# Patient Record
Sex: MALE | Race: WHITE | NOT HISPANIC OR LATINO | Employment: OTHER | URBAN - METROPOLITAN AREA
[De-identification: names, ages, dates, MRNs, and addresses within clinical notes are randomized per-mention and may not be internally consistent; named-entity substitution may affect disease eponyms.]

---

## 2020-02-06 ENCOUNTER — OFFICE VISIT (OUTPATIENT)
Dept: FAMILY MEDICINE CLINIC | Facility: CLINIC | Age: 65
End: 2020-02-06

## 2020-02-06 VITALS
HEIGHT: 70 IN | TEMPERATURE: 97.3 F | OXYGEN SATURATION: 95 % | RESPIRATION RATE: 16 BRPM | HEART RATE: 96 BPM | BODY MASS INDEX: 28.23 KG/M2 | WEIGHT: 197.2 LBS

## 2020-02-06 DIAGNOSIS — E03.9 ACQUIRED HYPOTHYROIDISM: ICD-10-CM

## 2020-02-06 DIAGNOSIS — Z00.00 HEALTH CARE MAINTENANCE: Primary | ICD-10-CM

## 2020-02-06 DIAGNOSIS — I10 ESSENTIAL HYPERTENSION: ICD-10-CM

## 2020-02-06 PROBLEM — Z28.20 VACCINE REFUSED BY PATIENT: Status: ACTIVE | Noted: 2020-02-06

## 2020-02-06 LAB — HBA1C MFR BLD: 5.3 %

## 2020-02-06 PROCEDURE — 85025 COMPLETE CBC W/AUTO DIFF WBC: CPT | Performed by: NURSE PRACTITIONER

## 2020-02-06 PROCEDURE — 80053 COMPREHEN METABOLIC PANEL: CPT | Performed by: NURSE PRACTITIONER

## 2020-02-06 PROCEDURE — 99396 PREV VISIT EST AGE 40-64: CPT | Performed by: NURSE PRACTITIONER

## 2020-02-06 PROCEDURE — 84443 ASSAY THYROID STIM HORMONE: CPT | Performed by: NURSE PRACTITIONER

## 2020-02-06 PROCEDURE — 83036 HEMOGLOBIN GLYCOSYLATED A1C: CPT | Performed by: NURSE PRACTITIONER

## 2020-02-06 PROCEDURE — 80061 LIPID PANEL: CPT | Performed by: NURSE PRACTITIONER

## 2020-02-06 PROCEDURE — 84439 ASSAY OF FREE THYROXINE: CPT | Performed by: NURSE PRACTITIONER

## 2020-02-06 RX ORDER — ANASTROZOLE 1 MG/1
0.5 TABLET ORAL 2 TIMES WEEKLY
COMMUNITY

## 2020-02-06 RX ORDER — LOSARTAN POTASSIUM 50 MG/1
50 TABLET ORAL DAILY
Qty: 90 TABLET | Refills: 3 | Status: SHIPPED | OUTPATIENT
Start: 2020-02-06 | End: 2020-02-07 | Stop reason: SDUPTHER

## 2020-02-06 RX ORDER — LEVOTHYROXINE AND LIOTHYRONINE 38; 9 UG/1; UG/1
60 TABLET ORAL DAILY
Qty: 90 TABLET | Refills: 3 | Status: SHIPPED | OUTPATIENT
Start: 2020-02-06 | End: 2021-02-09

## 2020-02-06 NOTE — PROGRESS NOTES
"Patient is here to establish care and for annual wellness exam.    Dr Lobo. [facility]. Managing Low T and PSA.    Concerns today none    Last health maintenance visit was 5 years . Overall they feel their health is good . Lives with alone  Occupation is IT. Patient's diet is in general, an \"unhealthy\" diet. Exercises regularly regularly 7 times weekly cardio and weights. Tobacco use Never used. Alcohol use is > 5 beers per week . Illicit drug no history of illicit drug use    Reproductive Health  Patient is not sexually active and prefers declined to answer partners.  no method at present time     Screening Tests  Vision Impairment. No Vision Impairment and Eye Exam Up To Date   Hearing normal  Dental: Brushes does teeth twice a day. Dental exam every six months?yes  Colonoscopy yes  Prostate Exam yes      Immunization History  Tdap? no  HPV? no  Pneumonia? no  Shingles? no    The following portions of the patient's history were reviewed and updated as appropriate: allergies, current medications, past family history, past medical history, past social history, past surgical history and problem list.    Past Medical History:   Diagnosis Date   • Elevated PSA    • Hypertension    • Hypothyroidism    • Low testosterone in male    • Vaccine refused by patient 2/6/2020       Family History   Problem Relation Age of Onset   • Cancer Mother         breast   • Cancer Father 90        colon, bladder   • Cancer Sister 53        breast, colon    • No Known Problems Brother    • Stroke Maternal Grandmother    • Stroke Maternal Grandfather    • No Known Problems Paternal Grandmother         101   • No Known Problems Paternal Grandfather         90       Past Surgical History:   Procedure Laterality Date   • CYST REMOVAL      epinodal cyst removal, back of neck       Social History     Socioeconomic History   • Marital status: Single     Spouse name: Not on file   • Number of children: Not on file   • Years of education: Not on " file   • Highest education level: Not on file   Occupational History   • Occupation: IT     Comment: formerly [employer]   Tobacco Use   • Smoking status: Never Smoker   • Smokeless tobacco: Never Used   Substance and Sexual Activity   • Alcohol use: Yes     Alcohol/week: 1.0 standard drinks     Types: 1 Shots of liquor per week     Comment: 12 total weekly    • Drug use: Never   • Sexual activity: Never   Social History Narrative    Lives with dog. Single       Review of Systems  Do you have pain that bothers you in your daily life? no  Review of Systems   Constitutional: Negative for fatigue, fever and unexpected weight change.   HENT: Negative for congestion, hearing loss, nosebleeds, rhinorrhea, sore throat, trouble swallowing and voice change.    Eyes: Negative for pain, discharge, redness and visual disturbance.   Respiratory: Negative for cough, chest tightness, shortness of breath and wheezing.    Cardiovascular: Negative for chest pain, palpitations and leg swelling.        [facility] for chest pain. Work up neg 2015. Not stress test   Gastrointestinal: Positive for abdominal pain. Negative for abdominal distention, anal bleeding, blood in stool, constipation, diarrhea, nausea and vomiting.        Heartburn     Endocrine: Negative for cold intolerance, heat intolerance, polydipsia, polyphagia and polyuria.   Genitourinary: Negative for dysuria, flank pain, frequency and hematuria.   Musculoskeletal: Positive for arthralgias. Negative for gait problem, joint swelling and myalgias.        Right elbow tendonitis   Skin: Negative for color change and rash.   Neurological: Negative for dizziness, tremors, seizures, syncope, speech difficulty, weakness, numbness and headaches.   Hematological: Negative.    Psychiatric/Behavioral: Negative.        Objective   Physical Exam   Constitutional: He is oriented to person, place, and time. He appears well-developed and well-nourished. No distress.   HENT:   Head:  Normocephalic and atraumatic.   Right Ear: Tympanic membrane and external ear normal.   Left Ear: Tympanic membrane and external ear normal.   Nose: Nose normal.   Mouth/Throat: Oropharynx is clear and moist. No oropharyngeal exudate.   Eyes: Pupils are equal, round, and reactive to light. Conjunctivae are normal. Right eye exhibits no discharge. Left eye exhibits no discharge. No scleral icterus.   Neck: Neck supple. No tracheal deviation present. No thyromegaly present.   Cardiovascular: Normal rate, regular rhythm and normal heart sounds. Exam reveals no gallop and no friction rub.   No murmur heard.  Pulmonary/Chest: Effort normal and breath sounds normal. No respiratory distress. He has no wheezes.   Abdominal: Soft. Bowel sounds are normal. He exhibits no distension and no mass. There is no tenderness.   Musculoskeletal: He exhibits no edema or deformity.   Lymphadenopathy:     He has no cervical adenopathy.   Neurological: He is alert and oriented to person, place, and time. Coordination normal.   Skin: Skin is warm and dry. Capillary refill takes less than 2 seconds. No rash noted. No erythema.   Psychiatric: He has a normal mood and affect. His speech is normal and behavior is normal. Judgment and thought content normal.   Nursing note and vitals reviewed.     Parker was seen today for establish care.    Diagnoses and all orders for this visit:    Health care maintenance  -     POC Glycosylated Hemoglobin (Hb A1C)  -     Cancel: POC Urinalysis Dipstick, Automated  -     CBC & Differential  -     Comprehensive Metabolic Panel  -     Lipid Panel  -     T4, Free  -     TSH  -     CBC Auto Differential    Acquired hypothyroidism  -     Thyroid 60 MG PO tablet; Take 1 tablet by mouth Daily.    Essential hypertension  -     losartan (COZAAR) 50 MG tablet; Take 1 tablet by mouth Daily.    Elevated PSA    Low testosterone in male        1. Discuss extended office hours and appropriate use of the ER. Discussed no  controlled substances prescribed from this office. Appropriate referrals will be made to pain management and psychiatry if needed. Stressed the importance and expectation of medical compliance with plan of care, medications, and follow up appointments.  2. Patient Counseling:  --Nutrition: Stressed importance of moderation in sodium/caffeine intake, saturated fat and cholesterol, caloric balance, sufficient intake of fresh fruits, vegetables, fiber, calcium  --Exercise: Stressed the importance of regular exercise.   --Substance Abuse: Discussed cessation/primary prevention of tobacco, alcohol, or other drug use; driving or other dangerous activities under the influence; availability of treatment for abuse.   --Injury prevention: Discussed safety belts, safety helmets, smoke detector, smoking near bedding or upholstery.   --Dental health: Discussed importance of regular tooth brushing, flossing, and dental visits.  --Immunizations reviewed. He declines.  --Discussed benefits of screening colonoscopy.  --After hours service discussed with patient    3. Discussed the patient's BMI with him.  The BMI is above average; BMI management plan is completed  4. Follow up next physical in 1 year  5. Discussed the nature of the disease including, risks, complications, implications, management, safe and proper use of medications. Encouraged therapeutic lifestyle changes including low calorie diet with plenty of fruits and vegetables, daily exercise, medication compliance, and keeping scheduled follow up appointments with me and any other providers. Encouraged patient to have appointment for complete physical, fasting labs, appropriate screenings, and immunizations on an annual basis.  6. Healthy gentleman. Anticipate visit every 6 months for thn and hypothyroidism. Will let Dr Lobo continue to follow psa.

## 2020-02-07 ENCOUNTER — TELEPHONE (OUTPATIENT)
Dept: FAMILY MEDICINE CLINIC | Facility: CLINIC | Age: 65
End: 2020-02-07

## 2020-02-07 DIAGNOSIS — I10 ESSENTIAL HYPERTENSION: ICD-10-CM

## 2020-02-07 PROBLEM — R74.8 ELEVATED LIVER ENZYMES: Status: ACTIVE | Noted: 2020-02-07

## 2020-02-07 PROBLEM — E78.2 MODERATE MIXED HYPERLIPIDEMIA NOT REQUIRING STATIN THERAPY: Status: ACTIVE | Noted: 2020-02-07

## 2020-02-07 PROBLEM — N28.9 RENAL INSUFFICIENCY: Status: ACTIVE | Noted: 2020-02-07

## 2020-02-07 LAB
ALBUMIN SERPL-MCNC: 4.5 G/DL (ref 3.5–5.2)
ALBUMIN/GLOB SERPL: 1.8 G/DL
ALP SERPL-CCNC: 80 U/L (ref 39–117)
ALT SERPL W P-5'-P-CCNC: 80 U/L (ref 1–41)
ANION GAP SERPL CALCULATED.3IONS-SCNC: 13.3 MMOL/L (ref 5–15)
AST SERPL-CCNC: 49 U/L (ref 1–40)
BASOPHILS # BLD AUTO: 0.05 10*3/MM3 (ref 0–0.2)
BASOPHILS NFR BLD AUTO: 1.1 % (ref 0–1.5)
BILIRUB SERPL-MCNC: 0.6 MG/DL (ref 0.2–1.2)
BUN BLD-MCNC: 20 MG/DL (ref 8–23)
BUN/CREAT SERPL: 14.2 (ref 7–25)
CALCIUM SPEC-SCNC: 9.9 MG/DL (ref 8.6–10.5)
CHLORIDE SERPL-SCNC: 99 MMOL/L (ref 98–107)
CHOLEST SERPL-MCNC: 237 MG/DL (ref 0–200)
CO2 SERPL-SCNC: 25.7 MMOL/L (ref 22–29)
CREAT BLD-MCNC: 1.41 MG/DL (ref 0.76–1.27)
DEPRECATED RDW RBC AUTO: 44.2 FL (ref 37–54)
EOSINOPHIL # BLD AUTO: 0.16 10*3/MM3 (ref 0–0.4)
EOSINOPHIL NFR BLD AUTO: 3.5 % (ref 0.3–6.2)
ERYTHROCYTE [DISTWIDTH] IN BLOOD BY AUTOMATED COUNT: 12.7 % (ref 12.3–15.4)
GFR SERPL CREATININE-BSD FRML MDRD: 51 ML/MIN/1.73
GLOBULIN UR ELPH-MCNC: 2.5 GM/DL
GLUCOSE BLD-MCNC: 123 MG/DL (ref 65–99)
HCT VFR BLD AUTO: 46.6 % (ref 37.5–51)
HDLC SERPL-MCNC: 62 MG/DL (ref 40–60)
HGB BLD-MCNC: 16.2 G/DL (ref 13–17.7)
IMM GRANULOCYTES # BLD AUTO: 0.02 10*3/MM3 (ref 0–0.05)
IMM GRANULOCYTES NFR BLD AUTO: 0.4 % (ref 0–0.5)
LDLC SERPL CALC-MCNC: 140 MG/DL (ref 0–100)
LDLC/HDLC SERPL: 2.26 {RATIO}
LYMPHOCYTES # BLD AUTO: 1.17 10*3/MM3 (ref 0.7–3.1)
LYMPHOCYTES NFR BLD AUTO: 25.9 % (ref 19.6–45.3)
MCH RBC QN AUTO: 33.1 PG (ref 26.6–33)
MCHC RBC AUTO-ENTMCNC: 34.8 G/DL (ref 31.5–35.7)
MCV RBC AUTO: 95.3 FL (ref 79–97)
MONOCYTES # BLD AUTO: 0.65 10*3/MM3 (ref 0.1–0.9)
MONOCYTES NFR BLD AUTO: 14.4 % (ref 5–12)
NEUTROPHILS # BLD AUTO: 2.47 10*3/MM3 (ref 1.7–7)
NEUTROPHILS NFR BLD AUTO: 54.7 % (ref 42.7–76)
NRBC BLD AUTO-RTO: 0 /100 WBC (ref 0–0.2)
PLATELET # BLD AUTO: 199 10*3/MM3 (ref 140–450)
PMV BLD AUTO: 10.4 FL (ref 6–12)
POTASSIUM BLD-SCNC: 4.4 MMOL/L (ref 3.5–5.2)
PROT SERPL-MCNC: 7 G/DL (ref 6–8.5)
RBC # BLD AUTO: 4.89 10*6/MM3 (ref 4.14–5.8)
SODIUM BLD-SCNC: 138 MMOL/L (ref 136–145)
T4 FREE SERPL-MCNC: 1.17 NG/DL (ref 0.93–1.7)
TRIGL SERPL-MCNC: 175 MG/DL (ref 0–150)
TSH SERPL DL<=0.05 MIU/L-ACNC: 2.86 UIU/ML (ref 0.27–4.2)
VLDLC SERPL-MCNC: 35 MG/DL (ref 5–40)
WBC NRBC COR # BLD: 4.52 10*3/MM3 (ref 3.4–10.8)

## 2020-02-07 RX ORDER — LOSARTAN POTASSIUM 50 MG/1
50 TABLET ORAL DAILY
Qty: 90 TABLET | Refills: 3 | Status: SHIPPED | OUTPATIENT
Start: 2020-02-07 | End: 2021-03-15 | Stop reason: SDUPTHER

## 2020-08-07 ENCOUNTER — TELEMEDICINE (OUTPATIENT)
Dept: FAMILY MEDICINE CLINIC | Facility: CLINIC | Age: 65
End: 2020-08-07

## 2020-08-07 DIAGNOSIS — E03.9 ACQUIRED HYPOTHYROIDISM: ICD-10-CM

## 2020-08-07 DIAGNOSIS — I10 ESSENTIAL HYPERTENSION: ICD-10-CM

## 2020-08-07 DIAGNOSIS — E78.2 MODERATE MIXED HYPERLIPIDEMIA NOT REQUIRING STATIN THERAPY: ICD-10-CM

## 2020-08-07 DIAGNOSIS — L30.9 DERMATITIS: Primary | ICD-10-CM

## 2020-08-07 LAB
ALBUMIN SERPL-MCNC: 4.3 G/DL (ref 3.5–5.2)
ALBUMIN/GLOB SERPL: 1.6 G/DL
ALP SERPL-CCNC: 81 U/L (ref 39–117)
ALT SERPL W P-5'-P-CCNC: 39 U/L (ref 1–41)
ANION GAP SERPL CALCULATED.3IONS-SCNC: 10.9 MMOL/L (ref 5–15)
AST SERPL-CCNC: 26 U/L (ref 1–40)
BILIRUB SERPL-MCNC: 0.8 MG/DL (ref 0–1.2)
BUN SERPL-MCNC: 18 MG/DL (ref 8–23)
BUN/CREAT SERPL: 13.4 (ref 7–25)
CALCIUM SPEC-SCNC: 10 MG/DL (ref 8.6–10.5)
CHLORIDE SERPL-SCNC: 101 MMOL/L (ref 98–107)
CHOLEST SERPL-MCNC: 200 MG/DL (ref 0–200)
CO2 SERPL-SCNC: 24.1 MMOL/L (ref 22–29)
CREAT SERPL-MCNC: 1.34 MG/DL (ref 0.76–1.27)
GFR SERPL CREATININE-BSD FRML MDRD: 54 ML/MIN/1.73
GLOBULIN UR ELPH-MCNC: 2.7 GM/DL
GLUCOSE SERPL-MCNC: 106 MG/DL (ref 65–99)
HDLC SERPL-MCNC: 48 MG/DL (ref 40–60)
LDLC SERPL CALC-MCNC: 101 MG/DL (ref 0–100)
LDLC/HDLC SERPL: 2.1 {RATIO}
POTASSIUM SERPL-SCNC: 4.8 MMOL/L (ref 3.5–5.2)
PROT SERPL-MCNC: 7 G/DL (ref 6–8.5)
SODIUM SERPL-SCNC: 136 MMOL/L (ref 136–145)
T4 FREE SERPL-MCNC: 1.03 NG/DL (ref 0.93–1.7)
TRIGL SERPL-MCNC: 255 MG/DL (ref 0–150)
TSH SERPL DL<=0.05 MIU/L-ACNC: 3.6 UIU/ML (ref 0.27–4.2)
VLDLC SERPL-MCNC: 51 MG/DL (ref 5–40)

## 2020-08-07 PROCEDURE — 80061 LIPID PANEL: CPT | Performed by: NURSE PRACTITIONER

## 2020-08-07 PROCEDURE — 84439 ASSAY OF FREE THYROXINE: CPT | Performed by: NURSE PRACTITIONER

## 2020-08-07 PROCEDURE — 99213 OFFICE O/P EST LOW 20 MIN: CPT | Performed by: NURSE PRACTITIONER

## 2020-08-07 PROCEDURE — 84443 ASSAY THYROID STIM HORMONE: CPT | Performed by: NURSE PRACTITIONER

## 2020-08-07 PROCEDURE — 80053 COMPREHEN METABOLIC PANEL: CPT | Performed by: NURSE PRACTITIONER

## 2020-08-07 RX ORDER — TRETINOIN 0.5 MG/G
CREAM TOPICAL NIGHTLY
Qty: 45 G | Refills: 5 | Status: SHIPPED | OUTPATIENT
Start: 2020-08-07

## 2020-08-07 NOTE — PROGRESS NOTES
Subjective   Parker Rios is a 64 y.o. male.     History of Present Illness Patient here to follow up on chronic problems and has a new problem  1. Complains of mask like rash on face that he has had before. Treats with retin-A.  2. Has hypothyroidism. On Thyroid 60mg daily. Compliant with meds no side effects.  3. On Losartan 50mg daily for htn. Compliant with meds no side effects.  4. Last lab with elevated lipids.    Has been working from home and working out at home. Now going back to the gym. Overall he is doing well.      Outpatient Encounter Medications as of 8/7/2020   Medication Sig Dispense Refill   • anastrozole (ARIMIDEX) 1 MG tablet Take 0.5 mg by mouth 2 (Two) Times a Week.     • losartan (COZAAR) 50 MG tablet Take 1 tablet by mouth Daily. 90 tablet 3   • Thyroid 60 MG PO tablet Take 1 tablet by mouth Daily. 90 tablet 3   • tretinoin (RETIN-A) 0.05 % cream Apply  topically to the appropriate area as directed Every Night. 45 g 5     No facility-administered encounter medications on file as of 8/7/2020.        The following portions of the patient's history were reviewed and updated as appropriate: allergies, current medications, past family history, past medical history, past social history, past surgical history and problem list.    Review of Systems   Constitutional: Negative for appetite change, fever and unexpected weight loss.   HENT: Negative for nosebleeds, sore throat and trouble swallowing.    Eyes: Negative for visual disturbance.   Respiratory: Negative for cough, shortness of breath and wheezing.    Cardiovascular: Negative for chest pain, palpitations and leg swelling.   Gastrointestinal: Negative for abdominal pain, blood in stool, constipation, diarrhea, nausea and vomiting.   Endocrine: Negative for polydipsia, polyphagia and polyuria.   Genitourinary: Negative for dysuria, frequency, hematuria and urinary incontinence.   Musculoskeletal: Negative for arthralgias, gait problem, joint  swelling and myalgias.   Skin: Negative for rash.   Neurological: Negative for dizziness, seizures, syncope and numbness.   Hematological: Negative for adenopathy. Does not bruise/bleed easily.   Psychiatric/Behavioral: Negative for sleep disturbance and depressed mood. The patient is not nervous/anxious.        Objective     There were no vitals taken for this visit.    Physical Exam   Constitutional: He is oriented to person, place, and time. He appears well-developed and well-nourished. No distress.   HENT:   Head: Normocephalic and atraumatic.   Right Ear: Tympanic membrane and external ear normal.   Left Ear: Tympanic membrane and external ear normal.   Nose: Nose normal.   Eyes: Conjunctivae are normal. Right eye exhibits no discharge. Left eye exhibits no discharge. No scleral icterus.   Pulmonary/Chest: Effort normal. No respiratory distress.   Musculoskeletal: He exhibits no edema or deformity.   Neurological: He is alert and oriented to person, place, and time. Coordination normal.   Skin: No rash noted. No erythema.   Psychiatric: He has a normal mood and affect. His speech is normal and behavior is normal. Judgment and thought content normal.         Assessment/Plan   Parker was seen today for rash, med refill, hyperlipidemia, hypothyroidism and hypertension.    Diagnoses and all orders for this visit:    Dermatitis  -     tretinoin (RETIN-A) 0.05 % cream; Apply  topically to the appropriate area as directed Every Night.    Essential hypertension  -     Comprehensive Metabolic Panel    Acquired hypothyroidism  -     T4, Free  -     TSH    Moderate mixed hyperlipidemia not requiring statin therapy  -     Comprehensive Metabolic Panel  -     Lipid Panel    Check labs.  Will follow up with results.

## 2020-11-25 ENCOUNTER — OFFICE VISIT (OUTPATIENT)
Dept: ORTHOPEDIC SURGERY | Facility: CLINIC | Age: 65
End: 2020-11-25

## 2020-11-25 VITALS — OXYGEN SATURATION: 98 % | WEIGHT: 195 LBS | HEIGHT: 70 IN | BODY MASS INDEX: 27.92 KG/M2 | HEART RATE: 109 BPM

## 2020-11-25 DIAGNOSIS — M25.521 RIGHT ELBOW PAIN: Primary | ICD-10-CM

## 2020-11-25 PROCEDURE — 99203 OFFICE O/P NEW LOW 30 MIN: CPT | Performed by: ORTHOPAEDIC SURGERY

## 2020-11-25 NOTE — PROGRESS NOTES
[facility] [place]    Subjective     Chief Complaint   Patient presents with   • Right Elbow - Pain        HPI  Parker Rios is a 65 y.o. male who presents with new problem of: right elbow pain.  Onset: mechanical fall. The issue has been ongoing for 4 day(s). Pain is a 4/10 on the pain scale. Pain is described as aching and stabbing. Associated symptoms include pain and stiffness. The pain is worse with working and leisure; resting improve the pain. Previous treatments have included: bracing.    I have reviewed the following portions of the patient's history:History of Present Illness and review of systems.    He slipped and fell on a wet floor 4 days ago.  He is already doing better.  He was told to come here for checkup.    Past Medical History:   Diagnosis Date   • Elevated liver enzymes 2/7/2020   • Elevated PSA    • Hypertension    • Hypothyroidism    • Low testosterone in male    • Moderate mixed hyperlipidemia not requiring statin therapy 2/7/2020   • Renal insufficiency 2/7/2020   • Vaccine refused by patient 2/6/2020      Past Surgical History:   Procedure Laterality Date   • CYST REMOVAL      epinodal cyst removal, back of neck      Family History   Problem Relation Age of Onset   • Cancer Mother         breast   • Cancer Father 90        colon, bladder   • Cancer Sister 53        breast, colon    • No Known Problems Brother    • Stroke Maternal Grandmother    • Stroke Maternal Grandfather    • No Known Problems Paternal Grandmother         101   • No Known Problems Paternal Grandfather         90     Social History     Socioeconomic History   • Marital status: Single     Spouse name: Not on file   • Number of children: Not on file   • Years of education: Not on file   • Highest education level: Not on file   Occupational History   • Occupation: IT     Comment: formerly [employer]   Tobacco Use   • Smoking status: Never Smoker   • Smokeless tobacco: Never Used   Substance and Sexual  "Activity   • Alcohol use: Yes     Alcohol/week: 1.0 standard drinks     Types: 1 Shots of liquor per week     Comment: 12 total weekly    • Drug use: Never   • Sexual activity: Never   Social History Narrative    Lives with dog. Single      Current Outpatient Medications on File Prior to Visit   Medication Sig Dispense Refill   • anastrozole (ARIMIDEX) 1 MG tablet Take 0.5 mg by mouth 2 (Two) Times a Week.     • losartan (COZAAR) 50 MG tablet Take 1 tablet by mouth Daily. 90 tablet 3   • Thyroid 60 MG PO tablet Take 1 tablet by mouth Daily. 90 tablet 3   • tretinoin (RETIN-A) 0.05 % cream Apply  topically to the appropriate area as directed Every Night. 45 g 5     No current facility-administered medications on file prior to visit.       No Known Allergies     The following portions of the patient's history were reviewed and updated as appropriate: allergies, current medications, past family history, past medical history, past social history, past surgical history and problem list.    Review of Systems   Constitutional: Negative.    HENT: Negative.    Eyes: Negative.    Respiratory: Negative.    Cardiovascular: Negative.    Gastrointestinal: Negative.    Endocrine: Negative.    Genitourinary: Negative.    Musculoskeletal: Positive for arthralgias.   Skin: Negative.    Allergic/Immunologic: Negative.    Neurological: Negative.    Hematological: Negative.    Psychiatric/Behavioral: Negative.         Objective      Physical Exam  Pulse 109   Ht 177.8 cm (70\")   Wt 88.5 kg (195 lb)   SpO2 98%   BMI 27.98 kg/m²     Body mass index is 27.98 kg/m².    GENERAL APPEARANCE: awake, alert & oriented x 3, in no acute distress and well developed, well nourished  PSYCH: normal mood and affect  LUNGS:  breathing nonlabored, no wheezing  EYES: sclera anicteric, pupils equal  CARDIOVASCULAR: palpable pulses. Capillary refill less than 2 seconds  INTEGUMENTARY: skin intact, no clubbing, cyanosis  NEUROLOGIC:  Normal Sensation and " reflexes       Ortho Exam  Peripheral Vascular   Right Upper Extremity    No cyanotic nail beds    Pink nail beds and rapid capillary refill   Palpation    Radial Pulse - Bilaterally normal    Neurologic   Sensory - Elbow   Inspection and Palpation:    Light touch: intact - right hand   Muscle Strength and Tone:    Right bicep - 5/5    Right tricep - 5/5    Right wrist extensors - 5/5     Right wrist flexors - 5/5    Right intrinsics - 5/5    Musculoskeletal   Right Upper Extremity - Elbow   Inspection and Palpation     Tenderness -over his brachial radialis    Effusion - none    Crepitus - none   Range of Motion    Flexion: AROM - 145 degrees    Extension - AROM - 0 degrees     Forearm supination: AROM - 90 degrees    Forearm pronation - AROM - 90 degrees   Instability    Right - tennis elbow test negative   Deformities/Malalignments/Discepancies - non   Functional testing:    Tinel's sign negative    Valgus stress test negative    Varus stress test negative    Imaging/Studies  Imaging Results (Last 7 Days)     ** No results found for the last 168 hours. **        I viewed his x-rays from November 22 which are negative.  His x-rays did show asymptomatic incidental arthritis with bone spurs  Assessment/Plan        ICD-10-CM ICD-9-CM   1. Right elbow pain  M25.521 719.42       Orders Placed This Encounter   Procedures   • Ambulatory Referral to Physical Therapy Evaluate and treat, Ortho      Plan be physical therapy.  Follow-up in 3 weeks.  If not better we will get an MRI.  He is already improving.  Medical Decision Making  Management Options : over-the-counter medicine and physical/occupational therapy  Data/Risk: radiology tests and independent visualization of imaging, lab tests, or EMG/NCV    Gwyn Troncoso MD  11/25/20  14:15 EST         EMR Dragon/Transcription disclaimer:  Much of this encounter note is an electronic transcription of spoken language to printed text. Electronic transcription of spoken  language may permit erroneous, or at times, nonsensical words or phrases to be inadvertently transcribed. Although I have reviewed the note for such errors, some may still exist.

## 2020-12-15 ENCOUNTER — TREATMENT (OUTPATIENT)
Dept: PHYSICAL THERAPY | Facility: CLINIC | Age: 65
End: 2020-12-15

## 2020-12-15 DIAGNOSIS — M25.521 RIGHT ELBOW PAIN: Primary | ICD-10-CM

## 2020-12-15 PROCEDURE — 97110 THERAPEUTIC EXERCISES: CPT | Performed by: PHYSICAL THERAPIST

## 2020-12-15 PROCEDURE — 97161 PT EVAL LOW COMPLEX 20 MIN: CPT | Performed by: PHYSICAL THERAPIST

## 2020-12-15 NOTE — PROGRESS NOTES
Physical Therapy Initial Evaluation and Plan of Care      Patient: Parker Rios   : 1955  Diagnosis/ICD-10 Code:  No primary diagnosis found.  Referring practitioner: Gwyn Troncoso MD  Date of Initial Visit: 12/15/2020  Today's Date: 12/15/2020  Patient seen for Visit count could not be calculated. Make sure you are using a visit which is associated with an episode. sessions           Subjective Questionnaire: QuickDASH: 25      Subjective Evaluation    History of Present Illness  Mechanism of injury: The pt stated that he feel onto his R elbow appx 4 weeks ago and had immediate onset of pain. He reported some swelling at the elbow and down to the fingers for a couple of days but stated this has subsided. Pain is lateral and goes down the volar forearm and is exacerbated with chest press and  press at the gym, pronation, and prolonged typing at work. Symptoms are improved with rest and stretching. He has not attempted any medicines, ice, heat, or creams. He uses an elbow compression sleeve that he feels helps somewhat and also reports mild relief with a home TENS unit.    He was treated for lateral epicondylitis earlier in the year and stated that this pain feels similar. He saw a chiropractor for this in February and received thrust mobilizations, dry needling, and cupping. He noted that he has also been having R shoulder pain and weakness for the last 6 months but does not feel it is related. He is an avid weight  and hopes to return to 100% function at the gym. He hopes to improve his strength, ROM into flexion, and overall pain.     Pain  Current pain ratin  At best pain ratin  At worst pain ratin  Location: R elbow  Quality: dull ache  Relieving factors: rest  Aggravating factors: lifting and movement  Progression: improved    Hand dominance: right    Diagnostic Tests  Abnormal x-ray: presence of osteophytes around radial head and humeroulnar  joint.    Treatments  Previous treatment: chiropractic  Patient Goals  Patient goals for therapy: decreased pain, increased motion, increased strength and return to sport/leisure activities             Objective          Observations     Right Elbow   Negative for edema.       Palpation     Right   No palpable tenderness to the biceps, brachialis, brachioradialis, triceps, wrist extensors and wrist flexors.   Hypertonic in the pronator teres. Tenderness of the pronator teres.     Tenderness     Right Elbow   No tenderness in the cubital tunnel, distal biceps tendon, lateral epicondyle, medial epicondyle and radial head.     Right Wrist/Hand   Tenderness in the distal biceps tendon, lateral epicondyle and medial epicondyle.     Neurological Testing     Sensation     Elbow   Left Elbow   Intact: light touch    Right Elbow   Intact: light touch    Active Range of Motion     Left Elbow   Flexion: 138 degrees   Extension: 12 degrees   Forearm supination: 81 degrees   Forearm pronation: 85 degrees     Right Elbow   Flexion: 121 degrees   Extension: 17 degrees   Forearm supination: 77 degrees   Forearm pronation: 81 degrees     Joint Play     Right Elbow   Joints within functional limits are the distal radioulnar joint. Hypomobile in the humeroulnar joint, humeroradial joint and proximal radioulnar joint.     Strength/Myotome Testing     Left Shoulder     Planes of Motion   Flexion: 5   Abduction: 5   External rotation at 0°: 5   Internal rotation at 0°: 5     Right Shoulder     Planes of Motion   Flexion: 4-   Abduction: 4-   External rotation at 0°: 4   Internal rotation at 0°: 5     Left Elbow   Flexion: 5  Extension: 5  Forearm supination: 5  Forearm pronation: 5    Right Elbow   Flexion: 5  Extension: 4+  Forearm supination: 4+ (pain)  Forearm pronation: 5          Assessment & Plan     Assessment  Impairments: abnormal muscle firing, abnormal or restricted ROM, impaired physical strength, lacks appropriate home  exercise program and pain with function  Assessment details: The patient is a 66 yo male who presented to PT with evolving characteristics of acute R elbow pain with low complexity. Signs and symptoms are consistent with an elbow contusion and possible forearm mm strain. He was treated for lateral epicondylitis by a chiropractor earlier in the year and believed his current pain was similar. However, he had no TTP in the lateral epicondyle, no pain with wrist extension, and no issues with gripping. The largest deficits today were pain with palpation to the lateral insertion of the pronator teres, pain with end-range elbow flexion, and elbow flexion ROM deficits. Pain was not elicited with muscle testing, making it difficult to differentiate specific muscles involved. He did have hypertonicity and TTP in the pronator teres and supinator so dry needling was performed locally and he was encouraged to stretch and perform self-massage. He was prescribed an HEP for humeroulnar joint distraction and forearm mm stretching. In addition to elbow pain, the pt demonstrated signs and symptoms consistent with R cuff pathology and was prescribed a light isometric cuff exercises and was encouraged to work on scapular retraction exercises at the gym. I expect the patient to make a timely recovery with skilled PT intervention.     Prognosis: good  Functional Limitations: carrying objects, lifting, reaching behind back, reaching overhead and unable to perform repetitive tasks  Goals  Plan Goals: Short Term Goals (4 weeks):     1. The patient will be independent and compliant with initial HEP.     2. The patient will report pain at rest 0/10 or less and worst pain 0/10 or less.    3. The patient will display decreased TTP in the R radial head and dec mm tension in the surrounding musculature.    4. R elbow AROM will improve to flex 145 deg, ext 0 deg, pron 80 deg, and sup 70 deg.    5. The patient will demonstrate inc strength evidenced  by MMT as follows: elbow flex 5/5, elbow ext 5/5, pron 5/5, and sup 5/5.    6. Quick DASH will improve by 11 points or more.         Long Term Goals (8 weeks):     1. The patient will be appropriate for independent management and compliant with progressed HEP.     2. The patient will report pain at rest 0/10 or less and worst pain 0/10 or less.    3. R elbow AROM will be within 5 degrees of the contralateral side in flex, ext, pron, and sup.     4. The patient will return to work duties and/or ADLs with no limitations due to elbow pain or dysfunction.    5. The patient will return to recreational and community activities with no limitations due to elbow pain or dysfunction.      Plan  Therapy options: will be seen for skilled physical therapy services  Planned modality interventions: cryotherapy, iontophoresis, TENS, electrical stimulation/Russian stimulation and thermotherapy (hydrocollator packs)  Planned therapy interventions: ADL retraining, body mechanics training, flexibility, functional ROM exercises, home exercise program, joint mobilization, manual therapy, neuromuscular re-education, postural training, soft tissue mobilization, strengthening, therapeutic activities and stretching  Frequency: 1x week  Duration in visits: 8  Duration in weeks: 8  Treatment plan discussed with: patient  Plan details: The patient will likely benefit from TE/TA/NMED to improve strength, UE proprioception, and scapular mobility. MT will be utilized in addition to stretching for improved elbow jt mobility and AROM. Modalities will be used as needed for pain modulation and reduction of swelling. Dry needling as indicated.           Visit Diagnoses:  No diagnosis found.    Timed:  Manual Therapy:    5     mins  88065;  Therapeutic Exercise:    8     mins  96821;     Neuromuscular Harjinder:    0    mins  14155;    Therapeutic Activity:     0     mins  57448;     Gait Trainin     mins  62456;     Ultrasound:     0     mins   93374;    Electrical Stimulation:    0     mins  45864 ( );    Untimed:  Electrical Stimulation:    0     mins  13147 ( );  Mechanical Traction:    0     mins  42257;     Timed Treatment:   13   mins   Total Treatment:     45   mins    PT SIGNATURE: Dieter Razo, JAVI   DATE TREATMENT INITIATED: 12/15/2020    Initial Certification  Certification Period: 3/15/2021  I certify that the therapy services are furnished while this patient is under my care.  The services outlined above are required by this patient, and will be reviewed every 90 days.     PHYSICIAN: Gwyn Troncoso MD      DATE:     Please sign and return via fax to [phone].. Thank you, [facility].

## 2020-12-16 ENCOUNTER — OFFICE VISIT (OUTPATIENT)
Dept: ORTHOPEDIC SURGERY | Facility: CLINIC | Age: 65
End: 2020-12-16

## 2020-12-16 VITALS — HEART RATE: 97 BPM | WEIGHT: 195.11 LBS | BODY MASS INDEX: 27.93 KG/M2 | OXYGEN SATURATION: 99 % | HEIGHT: 70 IN

## 2020-12-16 DIAGNOSIS — M25.521 RIGHT ELBOW PAIN: Primary | ICD-10-CM

## 2020-12-16 PROCEDURE — 99212 OFFICE O/P EST SF 10 MIN: CPT | Performed by: ORTHOPAEDIC SURGERY

## 2020-12-16 NOTE — PROGRESS NOTES
[facility] [place] [facility] Note    Subjective     Chief Complaint   Patient presents with   • Follow-up     3 week recheck - Right elbow pain         HPI  Parker Rios is a 65 y.o. male.  He is doing much better.  He is only been to physical therapy once and that was yesterday.  There is a delay.  But he is doing better.    Past Medical History:   Diagnosis Date   • Elevated liver enzymes 2/7/2020   • Elevated PSA    • Hypertension    • Hypothyroidism    • Low testosterone in male    • Moderate mixed hyperlipidemia not requiring statin therapy 2/7/2020   • Renal insufficiency 2/7/2020   • Vaccine refused by patient 2/6/2020      Past Surgical History:   Procedure Laterality Date   • CYST REMOVAL      epinodal cyst removal, back of neck      Family History   Problem Relation Age of Onset   • Cancer Mother         breast   • Cancer Father 90        colon, bladder   • Cancer Sister 53        breast, colon    • No Known Problems Brother    • Stroke Maternal Grandmother    • Stroke Maternal Grandfather    • No Known Problems Paternal Grandmother         101   • No Known Problems Paternal Grandfather         90     Social History     Socioeconomic History   • Marital status: Single     Spouse name: Not on file   • Number of children: Not on file   • Years of education: Not on file   • Highest education level: Not on file   Occupational History   • Occupation: IT     Comment: formerly [employer]   Tobacco Use   • Smoking status: Never Smoker   • Smokeless tobacco: Never Used   Substance and Sexual Activity   • Alcohol use: Yes     Alcohol/week: 1.0 standard drinks     Types: 1 Shots of liquor per week     Comment: 12 total weekly    • Drug use: Never   • Sexual activity: Never   Social History Narrative    Lives with dog. Single      Current Outpatient Medications on File Prior to Visit   Medication Sig Dispense Refill   • anastrozole (ARIMIDEX) 1 MG tablet Take 0.5 mg by mouth 2 (Two) Times a Week.     •  "losartan (COZAAR) 50 MG tablet Take 1 tablet by mouth Daily. 90 tablet 3   • Thyroid 60 MG PO tablet Take 1 tablet by mouth Daily. 90 tablet 3   • tretinoin (RETIN-A) 0.05 % cream Apply  topically to the appropriate area as directed Every Night. 45 g 5     No current facility-administered medications on file prior to visit.       No Known Allergies     The following portions of the patient's history were reviewed and updated as appropriate: allergies, current medications, past family history, past medical history, past social history, past surgical history and problem list.    Review of Systems   Constitutional: Negative.    HENT: Negative.    Eyes: Negative.    Respiratory: Negative.    Cardiovascular: Negative.    Gastrointestinal: Negative.    Endocrine: Negative.    Genitourinary: Negative.    Musculoskeletal: Positive for arthralgias.   Skin: Negative.    Allergic/Immunologic: Negative.    Neurological: Negative.    Hematological: Negative.    Psychiatric/Behavioral: Negative.         Objective      Physical Exam  Pulse 97   Ht 177.8 cm (70\")   Wt 88.5 kg (195 lb 1.7 oz)   SpO2 99%   BMI 27.99 kg/m²     Body mass index is 27.99 kg/m².    GENERAL APPEARANCE: awake, alert & oriented x 3, in no acute distress and well developed, well nourished  PSYCH: normal mood and affect  LUNGS:  breathing nonlabored, no wheezing  Right well has full motion full-strength.  No deficit.    Imaging/Studies  Imaging Results (Last 7 Days)     ** No results found for the last 168 hours. **          Assessment/Plan        ICD-10-CM ICD-9-CM   1. Right elbow pain  M25.521 719.42   He will continue physical therapy and follow-up as needed.  He is doing well.  If the pain gets worse I can order an MRI.  Medical Decision Making  Management Options : over-the-counter medicine and physical/occupational therapy    Gwyn Troncoso MD  12/16/20  14:06 EST         EMR Dragon/Transcription disclaimer:  Much of this encounter note is an " electronic transcription of spoken language to printed text. Electronic transcription of spoken language may permit erroneous, or at times, nonsensical words or phrases to be inadvertently transcribed. Although I have reviewed the note for such errors, some may still exist.

## 2020-12-22 ENCOUNTER — TREATMENT (OUTPATIENT)
Dept: PHYSICAL THERAPY | Facility: CLINIC | Age: 65
End: 2020-12-22

## 2020-12-22 DIAGNOSIS — M25.521 RIGHT ELBOW PAIN: Primary | ICD-10-CM

## 2020-12-22 PROCEDURE — 97110 THERAPEUTIC EXERCISES: CPT | Performed by: PHYSICAL THERAPIST

## 2020-12-22 PROCEDURE — 97140 MANUAL THERAPY 1/> REGIONS: CPT | Performed by: PHYSICAL THERAPIST

## 2020-12-22 NOTE — PROGRESS NOTES
Physical Therapy Daily Progress Note      Patient: Parker Rios   : 1955  Referring practitioner: Gwyn Troncoso MD  Date of Initial Visit: Type: THERAPY  Noted: 12/15/2020  Today's Date: 2020  Patient seen for 2 sessions           Subjective Evaluation    History of Present Illness  Mechanism of injury: The pt stated that his elbow pain has continued to fluctuate with activity since his eval and is generally worse with weight lifting. He has been compliant with his HEP and feels that the stretching is going well, noting an improvement in total elbow AROM. He reported feeling very good after dry needling last visit and requested repeat treatment.     Pain  Current pain ratin  Location: R elbow           Objective          Active Range of Motion     Right Elbow   Flexion: 129 degrees   Extension: 7 degrees       See Exercise, Manual, and Modality Logs for complete treatment.       Assessment & Plan     Assessment  Assessment details: The pt demonstrated improved R elbow AROM and has only minimal restrictions at this time. He continues to have elbow pain with lifting but these symptoms are not consistently reproduced in the clinic and he is minimally irritable. His HEP exercises were reviewed and still seem appropriate, but he was introduced to a self-mobilization into elbow extension that was added to his exercise list. MT was performed to improve global capsular mobility and he reported no pain during treatment. Dry needling was performed to the lateral elbow and the pt was encouraged to use heat at home.     Plan  Plan details: Repeat dry needling. Continue joint mobilization, emphasizing flexion. If feeling better, address the R shoulder.         Visit Diagnoses:    ICD-10-CM ICD-9-CM   1. Right elbow pain  M25.521 719.42       Progress per Plan of Care           Timed:  Manual Therapy:    12     mins  22389;  Therapeutic Exercise:    13     mins  38534;     Neuromuscular Harjinder:    0     mins  80031;    Therapeutic Activity:     0     mins  80206;     Gait Trainin     mins  86232;     Ultrasound:     0     mins  07951;    Electrical Stimulation:    0     mins  42936 ( );    Untimed:  Electrical Stimulation:    0     mins  85145 ( );  Mechanical Traction:    0     mins  92098;     Timed Treatment:   25   mins   Total Treatment:     30   mins  Dieter Razo PT  Physical Therapist

## 2020-12-29 ENCOUNTER — TREATMENT (OUTPATIENT)
Dept: PHYSICAL THERAPY | Facility: CLINIC | Age: 65
End: 2020-12-29

## 2020-12-29 DIAGNOSIS — M25.521 RIGHT ELBOW PAIN: Primary | ICD-10-CM

## 2020-12-29 PROCEDURE — 97110 THERAPEUTIC EXERCISES: CPT | Performed by: PHYSICAL THERAPIST

## 2020-12-29 NOTE — PROGRESS NOTES
Physical Therapy Daily Progress Note      Patient: Parker Rios   : 1955  Referring practitioner: Gwyn Troncoso MD  Date of Initial Visit: Type: THERAPY  Noted: 12/15/2020  Today's Date: 2020  Patient seen for 3 sessions           Subjective Evaluation    History of Present Illness  Mechanism of injury: The pt stated that his elbow has been feeling better and reports more tightness than pain. He has been able to perform triceps extensions in the gym for the first time without having to significantly lower the weight but reported he still feels weak. His biggest concern is his R shoulder, which he feels is getting worse and limits his ability to lift. He noted particular pain with rear delt flies and stated it was severe.     He felt that he initially hurt his R shoulder performing a 45 pound KB press earlier in the year. Pain gradually increased and he noticed a large loss in motion. He feels it is better now than it was initially but he hopes to return to lifting and to prevent future injuries. Pain is increased with bench press, incline press, and rear-delt exercises but is not brought on with ADLs.     Pain  Current pain ratin  At worst pain ratin  Location: R elbow           Objective   See Exercise, Manual, and Modality Logs for complete treatment.       Assessment & Plan     Assessment  Assessment details: The pt's elbow continues to get better between visits and is no longer bothering him much with lifting, outside of feelings of stiffness in the joint. He is managing these symptoms well with stretching and mobilizations and I expect this to continue to improve. He was introduced to forearm strengthening with a rice bucket today and was encouraged to continue independent strengthening at the gym. His R shoulder dysfunction is more concerning to me than the elbow and appears to limit him in his recreational activities much more. It was reassessed today and is consistent with a  cuff tear. He is a strong brenda but has poor strength in all planes of the R shoulder with notably excessive lateral scapular deviation and UT compensations with resisted elevation. He was prescribed an HEP for several periscapular mm strengthening exercises and light cuff activation. I advised that he avoid provocative activities and any weighted overhead exercises at the gym.    Plan  Plan details: Continue treatment of the elbow as needed and direct most attention towards the shoulder as able.         Visit Diagnoses:    ICD-10-CM ICD-9-CM   1. Right elbow pain  M25.521 719.42       Progress per Plan of Care           Timed:  Manual Therapy:    0     mins  18659;  Therapeutic Exercise:    40     mins  10900;     Neuromuscular Harjinder:    0    mins  72327;    Therapeutic Activity:     0     mins  78025;     Gait Trainin     mins  15944;     Ultrasound:     0     mins  90139;    Electrical Stimulation:    0     mins  37535 ( );    Untimed:  Electrical Stimulation:    0     mins  73460 ( );  Mechanical Traction:    0     mins  47231;     Timed Treatment:   40   mins   Total Treatment:     42   mins  Dieter Razo PT  Physical Therapist

## 2021-01-06 ENCOUNTER — TREATMENT (OUTPATIENT)
Dept: PHYSICAL THERAPY | Facility: CLINIC | Age: 66
End: 2021-01-06

## 2021-01-06 DIAGNOSIS — M25.521 RIGHT ELBOW PAIN: Primary | ICD-10-CM

## 2021-01-06 PROCEDURE — 97110 THERAPEUTIC EXERCISES: CPT | Performed by: PHYSICAL THERAPIST

## 2021-01-06 PROCEDURE — 97112 NEUROMUSCULAR REEDUCATION: CPT | Performed by: PHYSICAL THERAPIST

## 2021-01-06 NOTE — PROGRESS NOTES
Physical Therapy Daily Progress Note      Patient: Parker Rios   : 1955  Referring practitioner: Gwyn Troncoso MD  Date of Initial Visit: Type: THERAPY  Noted: 12/15/2020  Today's Date: 2021  Patient seen for 4 sessions           Subjective Evaluation    History of Present Illness  Mechanism of injury: The pt stated that his shoulder HEP has gone well and has been challenging but not painful. He reported fatigue with unweighted, prone ITY and felt it was the most difficult exercise on his HEP. He continues to report some elbow discomfort with weight lifting, particularly with chest press, and in the morning when he first wakes up. He feels he is able to manage pain with stretching and stated he has also be strengthening the forearm mm at the gym.     Pain  Current pain ratin  Location: R shoulder           Objective   See Exercise, Manual, and Modality Logs for complete treatment.       Assessment & Plan     Assessment  Assessment details: The pt is doing well managing his elbow pain independently with stretching and strengthening so interventions were directed mostly at the R shoulder today. He was prescribed a [facility] reach to work on elbow stability independently and was shown a finger- DB carry to improve forearm strength. His R shoulder AROM was full in all planes today but weakness of the middle trap, lower trap, and SA remains a limitation to function. Weakness here resulted in early lateral slide of the scapula and scapular winging with resisted shoulder flexion. When shoulder flexion was performed in the [facility] the scapulohumeral rhythm was improved, so a resisted wall slide was added to his HEP. Several SA exercises were introduced and were tolerated well but fatigue resulted in poor scapular mechanics. His HEP was progressed per external documentation and he was encouraged to increase reps of familiar exercises as tolerated.     Plan  Plan details: Continue strengthening of the  scapular retractors and SA in functional positions.         Visit Diagnoses:    ICD-10-CM ICD-9-CM   1. Right elbow pain  M25.521 719.42       Progress per Plan of Care           Timed:  Manual Therapy:    0     mins  59848;  Therapeutic Exercise:    30     mins  08239;     Neuromuscular Harjinder:    12    mins  54531;    Therapeutic Activity:     0     mins  36131;     Gait Trainin     mins  14018;     Ultrasound:     0     mins  91930;    Electrical Stimulation:    0     mins  67906 ( );    Untimed:  Electrical Stimulation:    0     mins  85397 ( );  Mechanical Traction:    0     mins  53125;     Timed Treatment:   42   mins   Total Treatment:     45   mins  Dieter Razo PT  Physical Therapist

## 2021-01-12 ENCOUNTER — TREATMENT (OUTPATIENT)
Dept: PHYSICAL THERAPY | Facility: CLINIC | Age: 66
End: 2021-01-12

## 2021-01-12 DIAGNOSIS — M25.521 RIGHT ELBOW PAIN: Primary | ICD-10-CM

## 2021-01-12 PROCEDURE — 97110 THERAPEUTIC EXERCISES: CPT | Performed by: PHYSICAL THERAPIST

## 2021-01-12 PROCEDURE — 97112 NEUROMUSCULAR REEDUCATION: CPT | Performed by: PHYSICAL THERAPIST

## 2021-01-12 NOTE — PROGRESS NOTES
Physical Therapy Daily Progress Note      Patient: Parker Rios   : 1955  Referring practitioner: Gwyn Troncoso MD  Date of Initial Visit: Type: THERAPY  Noted: 12/15/2020  Today's Date: 2021  Patient seen for 5 sessions           Subjective Evaluation    History of Present Illness  Mechanism of injury: The pt stated that his HEP has gone well and he feels it is still challenging, noting that the prone ITY is the toughest exercise. He believes he is getting nevin and stated that his shoulder did not bother him as much at the gym. He did not report any issue with his elbow this week and believes it is 80-85% recovered. He continues to report some elbow pain with rear delt flies at the gym.     Pain  Current pain ratin  Location: R elbow, R shoulder           Objective   See Exercise, Manual, and Modality Logs for complete treatment.       Assessment & Plan     Assessment  Assessment details: The pt has been consistent with his daily exercise for scapular strengthening and displayed appropriate form with all familiar exercises in the clinic today, but required cuing for setup with his [facility] reach matrix. His scapular stabilization was improved compared to last week but fatigue resulted in inconsistent scapular position and more notable lateral scapular slide. He responded well to cuing for retraction and initial activation from the cuff and anterior deltoid during wall slides and scapulohumeral rhythm was much improved. PNF exercises to strengthen the shoulder were introduced and tolerated well but were fatiguing. I do not expect large improvements in function until he is able to strengthen his scapular stabilizers so he will benefit from spreading visits out to strengthen independently between PT sessions.     Plan  Plan details: The pt will strengthen independently for 2 weeks and return for exercise progressions.         Visit Diagnoses:    ICD-10-CM ICD-9-CM   1. Right elbow pain   M25.521 719.42       Progress per Plan of Care           Timed:  Manual Therapy:    0     mins  74480;  Therapeutic Exercise:    30     mins  02559;     Neuromuscular Harjinder:    10    mins  29227;    Therapeutic Activity:     0     mins  25400;     Gait Trainin     mins  82725;     Ultrasound:     0     mins  01340;    Electrical Stimulation:    0     mins  75993 ( );    Untimed:  Electrical Stimulation:    0     mins  26282 ( );  Mechanical Traction:    0     mins  12559;     Timed Treatment:   40   mins   Total Treatment:     43   mins  Dieter Razo PT  Physical Therapist

## 2021-01-26 ENCOUNTER — TREATMENT (OUTPATIENT)
Dept: PHYSICAL THERAPY | Facility: CLINIC | Age: 66
End: 2021-01-26

## 2021-01-26 DIAGNOSIS — M25.521 RIGHT ELBOW PAIN: Primary | ICD-10-CM

## 2021-01-26 PROCEDURE — 97110 THERAPEUTIC EXERCISES: CPT | Performed by: PHYSICAL THERAPIST

## 2021-01-26 NOTE — PROGRESS NOTES
Re-Assessment / Re-Certification      Patient: Parker Rios   : 1955  Diagnosis/ICD-10 Code:  Right elbow pain [M25.521]  Referring practitioner: Gwyn Troncoso MD  Date of Initial Visit: Type: THERAPY  Noted: 12/15/2020  Today's Date: 2021  Patient seen for 6 sessions      Subjective:     Subjective Questionnaire: QuickDASH: 9  Clinical Progress: improved  Home Program Compliance: Yes  Treatment has included: therapeutic exercise, neuromuscular re-education, manual therapy, therapeutic activity and dry needling    Subjective Evaluation    History of Present Illness  Mechanism of injury: The pt stated that his elbow has been feeling better overall but reported a flare up in the last few days due to overworking himself at the gym. He continues to report relief with dry needling and feels his motion has improved with HEP performance. He believes his shoulder and scapular muscles are getting stronger and reported he has been tolerating lifting at the gym better. He has tried to progress his HEP independently by adding weight and stated he has been using as much as 10 pounds resistance for ITYs.     Pain  Current pain ratin  Location: R elbow; R shoulder         Objective          Observations     Right Elbow   Negative for edema.       Palpation     Right   No palpable tenderness to the biceps, brachialis, brachioradialis, pronator teres, triceps, wrist extensors and wrist flexors.     Tenderness     Right Shoulder  No tenderness in the biceps tendon (proximal), infraspinatus tendon and supraspinatus tendon.     Right Elbow   No tenderness in the cubital tunnel, distal biceps tendon, lateral epicondyle, medial epicondyle and radial head.     Right Wrist/Hand   Tenderness in the distal biceps tendon, lateral epicondyle and medial epicondyle.     Neurological Testing     Sensation     Elbow   Left Elbow   Intact: light touch    Right Elbow   Intact: light touch    Active Range of Motion     Left  Elbow   Flexion: 138 degrees   Extension: 12 degrees   Forearm supination: 81 degrees   Forearm pronation: 85 degrees     Right Elbow   Flexion: 128 degrees   Extension: 10 degrees   Forearm supination: 77 degrees   Forearm pronation: 81 degrees     Joint Play     Right Elbow   Joints within functional limits are the distal radioulnar joint. Hypomobile in the humeroulnar joint, humeroradial joint and proximal radioulnar joint.     Strength/Myotome Testing     Left Shoulder     Planes of Motion   Flexion: 5   Abduction: 5   External rotation at 0°: 5   Internal rotation at 0°: 5     Right Shoulder     Planes of Motion   Flexion: 4+   Abduction: 5   External rotation at 0°: 4   Internal rotation at 0°: 5     Isolated Muscles   Lower trapezius: 4   Middle trapezius: 4   Rhomboids: 4+     Left Elbow   Flexion: 5  Extension: 5  Forearm supination: 5  Forearm pronation: 5    Right Elbow   Flexion: 5  Extension: 4+  Forearm supination: 4+  Forearm pronation: 5      Assessment & Plan     Assessment  Impairments: abnormal muscle firing, abnormal or restricted ROM, impaired physical strength, lacks appropriate home exercise program and pain with function  Assessment details: The patient has responded very well to therapeutic interventions for R elbow and shoulder pain and saw large improvements in scapular stability, middle and low trap strength, shoulder function, and pain between visits. I was surprised to see as much improvement in shoulder and periscapular mm strength today as I did but he has been diligent with his HEP and has responded quickly. He has attempted to progress his ITYs independently but these were reviewed in the clinic and weight was reduced significantly from 10 pounds to 3 after observation of incorrect scapular positioning, likely straining the cuff. His serratus slides were significantly better and reps were progressed as tolerated. His CKC reach matrix was progressed to a pushup position. His elbow  was not TTP and his motion has improved near equivalence to the opposite side.   Prognosis: good  Functional Limitations: carrying objects, lifting, reaching behind back, reaching overhead and unable to perform repetitive tasks  Goals  Plan Goals: Short Term Goals (4 weeks):     1. The patient will be independent and compliant with initial HEP. Met    2. The patient will report pain at rest 0/10 or less and worst pain 0/10 or less. Ongoing    3. The patient will display decreased TTP in the R radial head and dec mm tension in the surrounding musculature. Met    4. R elbow AROM will improve to flex 145 deg, ext 0 deg, pron 80 deg, and sup 70 deg. Partially met    5. The patient will demonstrate inc strength evidenced by MMT as follows: elbow flex 5/5, elbow ext 5/5, pron 5/5, and sup 5/5. Partially met    6. Quick DASH will improve by 11 points or more. Met        Long Term Goals (8 weeks):     1. The patient will be appropriate for independent management and compliant with progressed HEP. Ongoing    2. The patient will report pain at rest 0/10 or less and worst pain 0/10 or less. Ongoing     3. R elbow AROM will be within 5 degrees of the contralateral side in flex, ext, pron, and sup. Met     4. The patient will return to work duties and/or ADLs with no limitations due to elbow pain or dysfunction. Ongoing     5. The patient will return to recreational and community activities with no limitations due to elbow pain or dysfunction. Ongoing       Plan  Therapy options: will be seen for skilled physical therapy services  Planned modality interventions: cryotherapy, iontophoresis, TENS, electrical stimulation/Russian stimulation and thermotherapy (hydrocollator packs)  Planned therapy interventions: ADL retraining, body mechanics training, flexibility, functional ROM exercises, home exercise program, joint mobilization, manual therapy, neuromuscular re-education, postural training, soft tissue mobilization,  strengthening, therapeutic activities and stretching  Frequency: 1x week  Duration in visits: 8  Duration in weeks: 8  Treatment plan discussed with: patient  Plan details: Continue scapular retractor and SA strengthening. Begin working into overhead positions in the [facility]. Consider PNF.           Visit Diagnoses:    ICD-10-CM ICD-9-CM   1. Right elbow pain  M25.521 719.42       Progress toward previous goals: Partially Met    PT Signature: Dieter Razo PT      Based upon review of the patient's progress and continued therapy plan, it is my medical opinion that Parker Rios should continue physical therapy treatment at [facility]  [address] [facility] [address] [phone].    Signature: __________________________________  Gwyn Troncoso MD    Timed:  Manual Therapy:    0     mins  20537;  Therapeutic Exercise:    30     mins  48453;     Neuromuscular Harjinder:    0    mins  41296;    Therapeutic Activity:     0     mins  19659;     Gait Trainin     mins  72543;     Ultrasound:     0     mins  62878;    Electrical Stimulation:    0     mins  39956 ( );    Untimed:  Electrical Stimulation:    0     mins  95231 ( );  Mechanical Traction:    0     mins  90150;     Timed Treatment:   30   mins   Total Treatment:     33   mins

## 2021-02-09 ENCOUNTER — TREATMENT (OUTPATIENT)
Dept: PHYSICAL THERAPY | Facility: CLINIC | Age: 66
End: 2021-02-09

## 2021-02-09 DIAGNOSIS — M25.521 RIGHT ELBOW PAIN: Primary | ICD-10-CM

## 2021-02-09 DIAGNOSIS — E03.9 ACQUIRED HYPOTHYROIDISM: ICD-10-CM

## 2021-02-09 PROCEDURE — 97110 THERAPEUTIC EXERCISES: CPT | Performed by: PHYSICAL THERAPIST

## 2021-02-09 PROCEDURE — 97112 NEUROMUSCULAR REEDUCATION: CPT | Performed by: PHYSICAL THERAPIST

## 2021-02-09 PROCEDURE — DRYNDL PR CUSTOM DRY NEEDLING SELF PAY: Performed by: PHYSICAL THERAPIST

## 2021-02-09 RX ORDER — THYROID 60 MG
TABLET ORAL
Qty: 30 TABLET | Refills: 5 | Status: SHIPPED | OUTPATIENT
Start: 2021-02-09 | End: 2021-04-23 | Stop reason: SDUPTHER

## 2021-02-09 NOTE — PROGRESS NOTES
Physical Therapy Daily Treatment Note      Patient: Parker iRos   : 1955  Referring practitioner: Gwyn Troncoso MD  Date of Initial Visit: Type: THERAPY  Noted: 12/15/2020  Today's Date: 2021  Patient seen for 7 sessions           Subjective Evaluation    History of Present Illness  Mechanism of injury: The patient reported that his shoulder is feeling about the same as it did two weeks ago, although he reported he has had less difficulty with rear delt exercises at the gym. He continues to struggle with overhead press and stated he has largely avoided this. He has been compliant with his HEP and feels it is still challenging. He reported that he has had minimal elbow pain and dysfunction in the last two weeks and requested repeat dry needling.    Pain  Current pain ratin  Location: R shoulder           Objective          Strength/Myotome Testing     Right Shoulder     Planes of Motion   Flexion: 4+   Abduction: 5   External rotation at 0°: 4-       See Exercise, Manual, and Modality Logs for complete treatment.       Assessment & Plan     Assessment  Assessment details: The patient continues to progress well in regards to shoulder pain and dysfunction and exhibited increased strength in shoulder flexion today. Shoulder external rotation strength is the most limited position so several exercises targeting this were introduced today. He struggled with specific in infraspinatus exercises and required rest breaks and low weight. Sideline and prone external rotation exercise were added to his HEP along with a stretch of the posterior corner. Dry needling was performed to the elbow again today in addition to needling of the posterior corner and resulted in dec pain and mm tension.    Plan  Plan details: Repeat needling to the posterior corner as indicated. Progress external rotation strengthening exercises.        Visit Diagnoses:    ICD-10-CM ICD-9-CM   1. Right elbow pain  M25.521 719.42        Progress per Plan of Care           Timed:  Manual Therapy:    0     mins  05482;  Therapeutic Exercise:    30     mins  78084;     Neuromuscular Harjinder:    8    mins  54343;    Therapeutic Activity:     0     mins  08495;     Gait Trainin     mins  81506;     Ultrasound:     0     mins  33775;    Electrical Stimulation:    0     mins  57258 ( );    Untimed:  Electrical Stimulation:    0     mins  89648 ( );  Mechanical Traction:    0     mins  07778;   Needle insertion without injection: 5 min     Timed Treatment:   38   mins   Total Treatment:     43   mins  Dieter Razo PT  Physical Therapist

## 2021-02-24 ENCOUNTER — TREATMENT (OUTPATIENT)
Dept: PHYSICAL THERAPY | Facility: CLINIC | Age: 66
End: 2021-02-24

## 2021-02-24 DIAGNOSIS — M25.521 RIGHT ELBOW PAIN: Primary | ICD-10-CM

## 2021-02-24 PROCEDURE — 97110 THERAPEUTIC EXERCISES: CPT | Performed by: PHYSICAL THERAPIST

## 2021-02-24 NOTE — PROGRESS NOTES
Re-Assessment / Re-Certification      Patient: Parker Rios   : 1955  Diagnosis/ICD-10 Code:  Right elbow pain [M25.521]  Referring practitioner: Gwyn Troncoso MD  Date of Initial Visit: Type: THERAPY  Noted: 12/15/2020  Today's Date: 2021  Patient seen for 8 sessions      Subjective:     Subjective Questionnaire: QuickDASH: 9  Clinical Progress: improved  Home Program Compliance: Yes  Treatment has included: therapeutic exercise, neuromuscular re-education, manual therapy, therapeutic activity and dry needling    Subjective Evaluation    History of Present Illness  Mechanism of injury: The pt stated that he was very sore after his last visit and stated he had trouble using her R shoulder for about 3 days. He attributed this to high reps of exercises last visit and stated he has had to back off of them at home. He now feels he has recovered and does not think he regressed. In fact, he has been able to perform all lifts at the gym with only minimal discomfort noted in overhead press. He no longer feels limited with his rear delt flies. He reported no elbow dysfunction.     Pain  Current pain ratin  Location: R shoulder         Objective          Observations     Right Elbow   Negative for edema.       Palpation     Right   No palpable tenderness to the biceps, brachialis, brachioradialis, pronator teres, triceps, wrist extensors and wrist flexors.     Tenderness     Right Shoulder  No tenderness in the biceps tendon (proximal), infraspinatus tendon and supraspinatus tendon.     Right Elbow   No tenderness in the cubital tunnel, distal biceps tendon, lateral epicondyle, medial epicondyle and radial head.     Right Wrist/Hand   Tenderness in the distal biceps tendon, lateral epicondyle and medial epicondyle.     Neurological Testing     Sensation     Elbow   Left Elbow   Intact: light touch    Right Elbow   Intact: light touch    Active Range of Motion     Left Elbow   Flexion: 138 degrees    Extension: 12 degrees   Forearm supination: 81 degrees   Forearm pronation: 85 degrees     Right Elbow   Flexion: 128 degrees   Extension: 10 degrees   Forearm supination: 77 degrees   Forearm pronation: 81 degrees     Joint Play     Right Elbow   Joints within functional limits are the distal radioulnar joint. Hypomobile in the humeroulnar joint, humeroradial joint and proximal radioulnar joint.     Strength/Myotome Testing     Left Shoulder     Planes of Motion   Flexion: 5   Abduction: 5   External rotation at 0°: 5   Internal rotation at 0°: 5     Right Shoulder     Planes of Motion   Flexion: 4+   Abduction: 5   External rotation at 0°: 4+   Internal rotation at 0°: 5     Isolated Muscles   Infraspinatus: 4   Lower trapezius: 4+   Middle trapezius: 4   Rhomboids: 4+     Left Elbow   Flexion: 5  Extension: 5  Forearm supination: 5  Forearm pronation: 5    Right Elbow   Flexion: 5  Extension: 4+  Forearm supination: 4+  Forearm pronation: 5      Assessment & Plan     Assessment  Impairments: abnormal muscle firing, abnormal or restricted ROM, impaired physical strength, lacks appropriate home exercise program and pain with function  Assessment details: The patient had a few days of significant soreness following his last visit but has recovered well. His ER and flexion strength have both continued to improve and are nearing contralateral equivalence. He continue to have some periscapular weakness but it is significantly improved compared to his IE. His HEP was modified today to include D2 and D2 PNF flexion along with reduced reps of ER strengthening. He displayed very good form in the clinic today and had no onset of pain. His function has significantly improved and he is getting to the point where he will likely do well with independence. He has met all of his short term goals and many of his long term goals. I would like to see him able to perform an OH press with no pain before d/c.  Prognosis:  good  Functional Limitations: carrying objects, lifting, reaching behind back, reaching overhead and unable to perform repetitive tasks  Goals  Plan Goals: Short Term Goals (4 weeks):     1. The patient will be independent and compliant with initial HEP. Met    2. The patient will report pain at rest 0/10 or less and worst pain 0/10 or less. Ongoing    3. The patient will display decreased TTP in the R radial head and dec mm tension in the surrounding musculature. Met    4. R elbow AROM will improve to flex 145 deg, ext 0 deg, pron 80 deg, and sup 70 deg. Partially met    5. The patient will demonstrate inc strength evidenced by MMT as follows: elbow flex 5/5, elbow ext 5/5, pron 5/5, and sup 5/5. Met    6. Quick DASH will improve by 11 points or more. Met        Long Term Goals (8 weeks):     1. The patient will be appropriate for independent management and compliant with progressed HEP. Ongoing    2. The patient will report pain at rest 0/10 or less and worst pain 0/10 or less. Ongoing     3. R elbow AROM will be within 5 degrees of the contralateral side in flex, ext, pron, and sup. Met     4. The patient will return to work duties and/or ADLs with no limitations due to elbow pain or dysfunction. Met    5. The patient will return to recreational and community activities with no limitations due to elbow pain or dysfunction. Ongoing       Plan  Therapy options: will be seen for skilled physical therapy services  Planned modality interventions: cryotherapy, iontophoresis, TENS, electrical stimulation/Russian stimulation and thermotherapy (hydrocollator packs)  Planned therapy interventions: ADL retraining, body mechanics training, flexibility, functional ROM exercises, home exercise program, joint mobilization, manual therapy, neuromuscular re-education, postural training, soft tissue mobilization, strengthening, therapeutic activities and stretching  Frequency: 1x week  Duration in visits: 8  Duration in weeks:  8  Treatment plan discussed with: patient  Plan details: The pt will attempted 1 month of independent management and will return to reassessment, HEP progression, and likely d/c.          Visit Diagnoses:    ICD-10-CM ICD-9-CM   1. Right elbow pain  M25.521 719.42       Progress toward previous goals: Partially Met    PT Signature: Dieter Razo PT      Based upon review of the patient's progress and continued therapy plan, it is my medical opinion that Parker Rios should continue physical therapy treatment at [facility]  [facility] [address] [facility] [address] [phone].    Signature: __________________________________  Gwyn Troncoso MD    Timed:  Manual Therapy:    0     mins  96796;  Therapeutic Exercise:    30     mins  96048;     Neuromuscular Harjinder:    0    mins  85759;    Therapeutic Activity:     0     mins  60906;     Gait Trainin     mins  91439;     Ultrasound:     0     mins  55798;    Electrical Stimulation:    0     mins  41293 ( );    Untimed:  Electrical Stimulation:    0     mins  01628 ( );  Mechanical Traction:    0     mins  64454;     Timed Treatment:   30   mins   Total Treatment:     32   mins

## 2021-03-15 DIAGNOSIS — I10 ESSENTIAL HYPERTENSION: ICD-10-CM

## 2021-03-15 RX ORDER — LOSARTAN POTASSIUM 50 MG/1
50 TABLET ORAL DAILY
Qty: 90 TABLET | Refills: 0 | Status: SHIPPED | OUTPATIENT
Start: 2021-03-15 | End: 2021-04-23 | Stop reason: SDUPTHER

## 2021-03-15 NOTE — TELEPHONE ENCOUNTER
Caller: Blanca Parker    Relationship: Self    Best call back number: [phone]    Medication needed:   Requested Prescriptions     Pending Prescriptions Disp Refills   • losartan (COZAAR) 50 MG tablet 90 tablet 3     Sig: Take 1 tablet by mouth Daily.       When do you need the refill by: 03/15/2021    What details did the patient provide when requesting the medication: PATIENT HAS LESS THAN A THREE DAY SUPPLY     Does the patient have less than a 3 day supply:  [x] Yes  [] No    What is the patient's preferred pharmacy: [facility] [address] [place] [address] [phone] [place] [phone]

## 2021-04-07 ENCOUNTER — TREATMENT (OUTPATIENT)
Dept: PHYSICAL THERAPY | Facility: CLINIC | Age: 66
End: 2021-04-07

## 2021-04-07 DIAGNOSIS — M25.521 RIGHT ELBOW PAIN: Primary | ICD-10-CM

## 2021-04-07 PROCEDURE — 97110 THERAPEUTIC EXERCISES: CPT | Performed by: PHYSICAL THERAPIST

## 2021-04-07 NOTE — PROGRESS NOTES
Re-Assessment / Re-Certification    Patient: Parker Rios   : 1955  Diagnosis/ICD-10 Code:  Right elbow pain [M25.521]  Referring practitioner: Gwyn Troncoso MD  Date of Initial Visit: Type: THERAPY  Noted: 12/15/2020  Today's Date: 2021  Patient seen for 9 sessions      Subjective:     Subjective Questionnaire: QuickDASH: 4.55  Clinical Progress: improved  Home Program Compliance: Yes  Treatment has included: therapeutic exercise, neuromuscular re-education, manual therapy, electrical stimulation and dry needling    Subjective Evaluation    History of Present Illness  Mechanism of injury: The pt stated that his shoulder has improved 90-95% since beginning rehab and reported that his elbow pain has fully resolved. He has returned to all lifts at the gym and feels limited by muscle fatigue more than anything. He noted particularly quick onset of fatigue with OH press but no pain. He has remained compliant with his HEP and feels it is still challenging. He requested a group of easier stretches and exercises to perform on a daily basis in addition to his HEP. He is agreeable to d/c.     Pain  Current pain ratin  Location: R shoulder         Objective          Observations     Right Elbow   Negative for edema.       Palpation     Right   No palpable tenderness to the biceps, brachialis, brachioradialis, pronator teres, triceps, wrist extensors and wrist flexors.     Tenderness     Right Shoulder  No tenderness in the biceps tendon (proximal), infraspinatus tendon and supraspinatus tendon.     Right Elbow   No tenderness in the cubital tunnel, distal biceps tendon, lateral epicondyle, medial epicondyle and radial head.     Right Wrist/Hand   Tenderness in the distal biceps tendon, lateral epicondyle and medial epicondyle.     Neurological Testing     Sensation     Elbow   Left Elbow   Intact: light touch    Right Elbow   Intact: light touch    Active Range of Motion     Left Elbow   Flexion: 138  degrees   Extension: 12 degrees   Forearm supination: 81 degrees   Forearm pronation: 85 degrees     Right Elbow   Flexion: 128 degrees   Extension: 10 degrees   Forearm supination: 77 degrees   Forearm pronation: 81 degrees     Joint Play     Right Elbow   Joints within functional limits are the distal radioulnar joint. Hypomobile in the humeroulnar joint, humeroradial joint and proximal radioulnar joint.     Strength/Myotome Testing     Left Shoulder     Planes of Motion   Flexion: 5   Abduction: 5   External rotation at 0°: 5   Internal rotation at 0°: 5     Right Shoulder     Planes of Motion   Flexion: 5   Abduction: 5   External rotation at 0°: 4+   Internal rotation at 0°: 5     Isolated Muscles   Infraspinatus: 4+   Lower trapezius: 4+   Middle trapezius: 4+   Rhomboids: 5     Left Elbow   Flexion: 5  Extension: 5  Forearm supination: 5  Forearm pronation: 5    Right Elbow   Flexion: 5  Extension: 4+  Forearm supination: 4+  Forearm pronation: 5      Assessment & Plan     Assessment  Impairments: abnormal muscle firing, abnormal or restricted ROM, impaired physical strength, lacks appropriate home exercise program and pain with function  Assessment details: The patient has responded very well to PT interventions for the treatment of R shoulder and elbow pain and no longer feels limited in his ADLs, work duties, or recreational activities. Elbow dysfunction has completely resolved and shoulder strength, motion, and pain have vastly improved from baseline. He continues to have minor weakness in shoulder ER and in the low trap but strength is functional and will continue to improve with exercise. He was prescribed an additional IR stretch at the pt's request and was encouraged to perform light cuff activation exercises prior to heavy UE lifts. He has met all of his goals for rehab and is appropriate for d/c.   Prognosis: good  Functional Limitations: carrying objects, lifting, reaching behind back, reaching  overhead and unable to perform repetitive tasks  Goals  Plan Goals: Short Term Goals (4 weeks):     1. The patient will be independent and compliant with initial HEP. Met    2. The patient will report pain at rest 0/10 or less and worst pain 0/10 or less. Met    3. The patient will display decreased TTP in the R radial head and dec mm tension in the surrounding musculature. Met    4. R elbow AROM will improve to flex 145 deg, ext 0 deg, pron 80 deg, and sup 70 deg. Partially met    5. The patient will demonstrate inc strength evidenced by MMT as follows: elbow flex 5/5, elbow ext 5/5, pron 5/5, and sup 5/5. Met    6. Quick DASH will improve by 11 points or more. Met        Long Term Goals (8 weeks):     1. The patient will be appropriate for independent management and compliant with progressed HEP. Met    2. The patient will report pain at rest 0/10 or less and worst pain 0/10 or less. Met    3. R elbow AROM will be within 5 degrees of the contralateral side in flex, ext, pron, and sup. Met     4. The patient will return to work duties and/or ADLs with no limitations due to elbow pain or dysfunction. Met    5. The patient will return to recreational and community activities with no limitations due to elbow pain or dysfunction. Met      Plan  Therapy options: will be seen for skilled physical therapy services  Planned modality interventions: cryotherapy, iontophoresis, TENS, electrical stimulation/Russian stimulation and thermotherapy (hydrocollator packs)  Planned therapy interventions: ADL retraining, body mechanics training, flexibility, functional ROM exercises, home exercise program, joint mobilization, manual therapy, neuromuscular re-education, postural training, soft tissue mobilization, strengthening, therapeutic activities and stretching  Treatment plan discussed with: patient  Plan details: Pt to be d/c.           Visit Diagnoses:    ICD-10-CM ICD-9-CM   1. Right elbow pain  M25.521 719.42       Progress  toward previous goals: All Met    PT Signature: Dieter Razo PT      Based upon review of the patient's progress and continued therapy plan, it is my medical opinion that Parker Rios should continue physical therapy treatment at [facility]  [facility] [address] [facility]  [place] [id number] [phone].    Signature: __________________________________  Gwyn Troncoso MD    Timed:  Manual Therapy:    0     mins  56364;  Therapeutic Exercise:    20     mins  21158;     Neuromuscular Harjinder:    0    mins  30275;    Therapeutic Activity:     0     mins  09932;     Gait Trainin     mins  38787;     Ultrasound:     0     mins  92022;    Electrical Stimulation:    0     mins  03949 ( );    Untimed:  Electrical Stimulation:    0     mins  58738 ( );  Mechanical Traction:    0     mins  58577;     Timed Treatment:   20   mins   Total Treatment:     20   mins          Discharge Summary  Discharge Summary from Physical Therapy Report      Date of initial PT visit: 12/15/20    Number of Visits: 9     Goals: All Met    Discharge Plan: Continue with current home exercise program as instructed    Comments: see assessment    Date of Discharge: 21        Dieter Razo PT  Physical Therapist

## 2021-04-23 ENCOUNTER — OFFICE VISIT (OUTPATIENT)
Dept: FAMILY MEDICINE CLINIC | Facility: CLINIC | Age: 66
End: 2021-04-23

## 2021-04-23 VITALS
TEMPERATURE: 97.7 F | BODY MASS INDEX: 28.86 KG/M2 | SYSTOLIC BLOOD PRESSURE: 138 MMHG | RESPIRATION RATE: 16 BRPM | DIASTOLIC BLOOD PRESSURE: 84 MMHG | OXYGEN SATURATION: 98 % | HEART RATE: 100 BPM | WEIGHT: 201.6 LBS | HEIGHT: 70 IN

## 2021-04-23 DIAGNOSIS — E03.9 ACQUIRED HYPOTHYROIDISM: ICD-10-CM

## 2021-04-23 DIAGNOSIS — I10 ESSENTIAL HYPERTENSION: ICD-10-CM

## 2021-04-23 DIAGNOSIS — Z00.00 WELL ADULT EXAM: Primary | ICD-10-CM

## 2021-04-23 PROBLEM — R74.8 ELEVATED LIVER ENZYMES: Status: RESOLVED | Noted: 2020-02-07 | Resolved: 2021-04-23

## 2021-04-23 LAB
ALBUMIN SERPL-MCNC: 4.2 G/DL (ref 3.5–5.2)
ALBUMIN/GLOB SERPL: 1.6 G/DL
ALP SERPL-CCNC: 79 U/L (ref 39–117)
ALT SERPL W P-5'-P-CCNC: 48 U/L (ref 1–41)
ANION GAP SERPL CALCULATED.3IONS-SCNC: 10.8 MMOL/L (ref 5–15)
AST SERPL-CCNC: 36 U/L (ref 1–40)
BASOPHILS # BLD AUTO: 0.06 10*3/MM3 (ref 0–0.2)
BASOPHILS NFR BLD AUTO: 1.4 % (ref 0–1.5)
BILIRUB SERPL-MCNC: 0.7 MG/DL (ref 0–1.2)
BUN SERPL-MCNC: 14 MG/DL (ref 8–23)
BUN/CREAT SERPL: 9.7 (ref 7–25)
CALCIUM SPEC-SCNC: 10 MG/DL (ref 8.6–10.5)
CHLORIDE SERPL-SCNC: 101 MMOL/L (ref 98–107)
CHOLEST SERPL-MCNC: 183 MG/DL (ref 0–200)
CO2 SERPL-SCNC: 27.2 MMOL/L (ref 22–29)
CREAT SERPL-MCNC: 1.44 MG/DL (ref 0.76–1.27)
DEPRECATED RDW RBC AUTO: 44.2 FL (ref 37–54)
EOSINOPHIL # BLD AUTO: 0.11 10*3/MM3 (ref 0–0.4)
EOSINOPHIL NFR BLD AUTO: 2.5 % (ref 0.3–6.2)
ERYTHROCYTE [DISTWIDTH] IN BLOOD BY AUTOMATED COUNT: 12.5 % (ref 12.3–15.4)
GFR SERPL CREATININE-BSD FRML MDRD: 50 ML/MIN/1.73
GLOBULIN UR ELPH-MCNC: 2.6 GM/DL
GLUCOSE SERPL-MCNC: 106 MG/DL (ref 65–99)
HBA1C MFR BLD: 5.43 % (ref 4.8–5.6)
HCT VFR BLD AUTO: 53 % (ref 37.5–51)
HCV AB SER DONR QL: NORMAL
HDLC SERPL-MCNC: 51 MG/DL (ref 40–60)
HGB BLD-MCNC: 18.4 G/DL (ref 13–17.7)
IMM GRANULOCYTES # BLD AUTO: 0.03 10*3/MM3 (ref 0–0.05)
IMM GRANULOCYTES NFR BLD AUTO: 0.7 % (ref 0–0.5)
LDLC SERPL CALC-MCNC: 108 MG/DL (ref 0–100)
LDLC/HDLC SERPL: 2.05 {RATIO}
LYMPHOCYTES # BLD AUTO: 1.13 10*3/MM3 (ref 0.7–3.1)
LYMPHOCYTES NFR BLD AUTO: 25.5 % (ref 19.6–45.3)
MCH RBC QN AUTO: 33.9 PG (ref 26.6–33)
MCHC RBC AUTO-ENTMCNC: 34.7 G/DL (ref 31.5–35.7)
MCV RBC AUTO: 97.8 FL (ref 79–97)
MONOCYTES # BLD AUTO: 0.59 10*3/MM3 (ref 0.1–0.9)
MONOCYTES NFR BLD AUTO: 13.3 % (ref 5–12)
NEUTROPHILS NFR BLD AUTO: 2.51 10*3/MM3 (ref 1.7–7)
NEUTROPHILS NFR BLD AUTO: 56.6 % (ref 42.7–76)
NRBC BLD AUTO-RTO: 0 /100 WBC (ref 0–0.2)
PLATELET # BLD AUTO: 196 10*3/MM3 (ref 140–450)
PMV BLD AUTO: 10.5 FL (ref 6–12)
POTASSIUM SERPL-SCNC: 5.1 MMOL/L (ref 3.5–5.2)
PROT SERPL-MCNC: 6.8 G/DL (ref 6–8.5)
RBC # BLD AUTO: 5.42 10*6/MM3 (ref 4.14–5.8)
SODIUM SERPL-SCNC: 139 MMOL/L (ref 136–145)
TRIGL SERPL-MCNC: 137 MG/DL (ref 0–150)
VLDLC SERPL-MCNC: 24 MG/DL (ref 5–40)
WBC # BLD AUTO: 4.43 10*3/MM3 (ref 3.4–10.8)

## 2021-04-23 PROCEDURE — 83036 HEMOGLOBIN GLYCOSYLATED A1C: CPT | Performed by: FAMILY MEDICINE

## 2021-04-23 PROCEDURE — 99396 PREV VISIT EST AGE 40-64: CPT | Performed by: FAMILY MEDICINE

## 2021-04-23 PROCEDURE — 86803 HEPATITIS C AB TEST: CPT | Performed by: FAMILY MEDICINE

## 2021-04-23 PROCEDURE — 80061 LIPID PANEL: CPT | Performed by: FAMILY MEDICINE

## 2021-04-23 PROCEDURE — 85025 COMPLETE CBC W/AUTO DIFF WBC: CPT | Performed by: FAMILY MEDICINE

## 2021-04-23 PROCEDURE — 80053 COMPREHEN METABOLIC PANEL: CPT | Performed by: FAMILY MEDICINE

## 2021-04-23 PROCEDURE — 84439 ASSAY OF FREE THYROXINE: CPT | Performed by: FAMILY MEDICINE

## 2021-04-23 PROCEDURE — 84443 ASSAY THYROID STIM HORMONE: CPT | Performed by: FAMILY MEDICINE

## 2021-04-23 RX ORDER — LOSARTAN POTASSIUM 50 MG/1
50 TABLET ORAL DAILY
Qty: 90 TABLET | Refills: 3 | Status: SHIPPED | OUTPATIENT
Start: 2021-04-23 | End: 2021-06-25 | Stop reason: SDUPTHER

## 2021-04-23 RX ORDER — LEVOTHYROXINE AND LIOTHYRONINE 38; 9 UG/1; UG/1
60 TABLET ORAL DAILY
Qty: 90 TABLET | Refills: 3 | Status: SHIPPED | OUTPATIENT
Start: 2021-04-23 | End: 2022-09-16 | Stop reason: SDUPTHER

## 2021-04-23 NOTE — PROGRESS NOTES
Parker Rios is a 65 y.o. male who presents today to establish care and complete annual exam.    Chief Complaint   Patient presents with   • Establish Care   • Annual Exam        Patient is here to establish care and complete annual physical exam.  His previous provider left the practice.  Patient is currently being treated for hypertension, hypothyroidism, low testosterone, acne, and elevated PSA.  Patient follows with urology for the elevated PSA, and low testosterone.  Patient states blood pressure is well controlled at home usually around 120/80.  Patient exercises regularly and eats a healthy and varied diet.  He is regular follow-up with dentist, optometrist, and dermatologist.  He does not believe that he is due for colonoscopy at this time.  He follows with [facility] for colonoscopies.  Patient is not interested in vaccines at this time.  Patient has no acute complaints today.         Review of Systems   Constitutional: Negative for fever and unexpected weight loss.   HENT: Negative for congestion, ear pain and sore throat.    Eyes: Negative for visual disturbance.   Respiratory: Negative for cough, shortness of breath and wheezing.    Cardiovascular: Negative for chest pain and palpitations.   Gastrointestinal: Negative for abdominal pain, blood in stool, constipation, diarrhea, nausea, vomiting and GERD.   Endocrine: Negative for polydipsia and polyuria.   Genitourinary: Negative for difficulty urinating.   Musculoskeletal: Negative for joint swelling.   Skin: Negative for rash and skin lesions.   Allergic/Immunologic: Negative for environmental allergies.   Neurological: Negative for seizures and syncope.   Hematological: Does not bruise/bleed easily.   Psychiatric/Behavioral: Negative for suicidal ideas.        PHQ-9 Depression Screening  Little interest or pleasure in doing things? 0   Feeling down, depressed, or hopeless? 0   Trouble falling or staying asleep, or sleeping too much?     Feeling tired or  having little energy?     Poor appetite or overeating?     Feeling bad about yourself - or that you are a failure or have let yourself or your family down?     Trouble concentrating on things, such as reading the newspaper or watching television?     Moving or speaking so slowly that other people could have noticed? Or the opposite - being so fidgety or restless that you have been moving around a lot more than usual?     Thoughts that you would be better off dead, or of hurting yourself in some way?     PHQ-9 Total Score 0   If you checked off any problems, how difficult have these problems made it for you to do your work, take care of things at home, or get along with other people?         Past Medical History:   Diagnosis Date   • Arm injury     November 2020   • Elevated liver enzymes 2/7/2020   • Elevated PSA    • Hypertension    • Hypothyroidism    • Low testosterone in male    • Moderate mixed hyperlipidemia not requiring statin therapy 2/7/2020   • Renal insufficiency 2/7/2020   • Vaccine refused by patient 2/6/2020        Past Surgical History:   Procedure Laterality Date   • CYST REMOVAL      epinodal cyst removal, back of neck   • TONSILLECTOMY     • WISDOM TOOTH EXTRACTION          Family History   Problem Relation Age of Onset   • Breast cancer Mother    • Colon cancer Father 90        colon   • Cancer Father         bladder   • Breast cancer Sister 56   • Colon cancer Sister 53   • Gallbladder disease Brother    • Stroke Maternal Grandmother    • Stroke Maternal Grandfather    • Other Paternal Grandmother 101        unknown   • Other Paternal Grandfather 90        unknown        Social History     Socioeconomic History   • Marital status: Single     Spouse name: Not on file   • Number of children: Not on file   • Years of education: Not on file   • Highest education level: Not on file   Tobacco Use   • Smoking status: Never Smoker   • Smokeless tobacco: Never Used   Vaping Use   • Vaping Use: Never used  "  Substance and Sexual Activity   • Alcohol use: Yes     Alcohol/week: 1.0 standard drinks     Types: 1 Shots of liquor per week     Comment: 12 total weekly    • Drug use: Never   • Sexual activity: Not Currently        Current Outpatient Medications on File Prior to Visit   Medication Sig Dispense Refill   • anastrozole (ARIMIDEX) 1 MG tablet Take 0.5 mg by mouth 2 (Two) Times a Week.     • Testosterone (TESTOPEL) 75 MG implant pellet by Implant route 1 (One) Time.     • tretinoin (RETIN-A) 0.05 % cream Apply  topically to the appropriate area as directed Every Night. 45 g 5   • [DISCONTINUED] [place] Thyroid 60 MG tablet TAKE 1 TABLET BY MOUTH EVERY DAY 30 tablet 5   • [DISCONTINUED] losartan (COZAAR) 50 MG tablet Take 1 tablet by mouth Daily. 90 tablet 0     No current facility-administered medications on file prior to visit.       No Known Allergies     Visit Vitals  /84 (BP Location: Right arm, Patient Position: Sitting, Cuff Size: Adult)   Pulse 100   Temp 97.7 °F (36.5 °C) (Temporal)   Resp 16   Ht 177.8 cm (70\")   Wt 91.4 kg (201 lb 9.6 oz)   SpO2 98%   BMI 28.93 kg/m²      Body mass index is 28.93 kg/m².    Physical Exam  Constitutional:       General: He is not in acute distress.     Appearance: He is well-developed. He is not diaphoretic.   HENT:      Head: Atraumatic.   Cardiovascular:      Rate and Rhythm: Normal rate and regular rhythm.      Heart sounds: Normal heart sounds. No murmur heard.   No friction rub. No gallop.    Pulmonary:      Effort: Pulmonary effort is normal. No respiratory distress.      Breath sounds: Normal breath sounds. No stridor. No wheezing or rales.   Abdominal:      General: Bowel sounds are normal. There is no distension.      Palpations: Abdomen is soft. There is no mass.      Tenderness: There is no abdominal tenderness. There is no guarding or rebound.      Hernia: No hernia is present.   Musculoskeletal:      Cervical back: Normal range of motion and neck supple. "   Skin:     General: Skin is warm and dry.   Neurological:      Mental Status: He is alert and oriented to person, place, and time.   Psychiatric:         Behavior: Behavior normal.          Results for orders placed or performed in visit on 08/07/20   Comprehensive Metabolic Panel    Specimen: Blood   Result Value Ref Range    Glucose 106 (H) 65 - 99 mg/dL    BUN 18 8 - 23 mg/dL    Creatinine 1.34 (H) 0.76 - 1.27 mg/dL    Sodium 136 136 - 145 mmol/L    Potassium 4.8 3.5 - 5.2 mmol/L    Chloride 101 98 - 107 mmol/L    CO2 24.1 22.0 - 29.0 mmol/L    Calcium 10.0 8.6 - 10.5 mg/dL    Total Protein 7.0 6.0 - 8.5 g/dL    Albumin 4.30 3.50 - 5.20 g/dL    ALT (SGPT) 39 1 - 41 U/L    AST (SGOT) 26 1 - 40 U/L    Alkaline Phosphatase 81 39 - 117 U/L    Total Bilirubin 0.8 0.0 - 1.2 mg/dL    eGFR Non African Amer 54 (L) >60 mL/min/1.73    Globulin 2.7 gm/dL    A/G Ratio 1.6 g/dL    BUN/Creatinine Ratio 13.4 7.0 - 25.0    Anion Gap 10.9 5.0 - 15.0 mmol/L   Lipid Panel    Specimen: Blood   Result Value Ref Range    Total Cholesterol 200 0 - 200 mg/dL    Triglycerides 255 (H) 0 - 150 mg/dL    HDL Cholesterol 48 40 - 60 mg/dL    LDL Cholesterol  101 (H) 0 - 100 mg/dL    VLDL Cholesterol 51 (H) 5 - 40 mg/dL    LDL/HDL Ratio 2.10    T4, Free    Specimen: Blood   Result Value Ref Range    Free T4 1.03 0.93 - 1.70 ng/dL   TSH    Specimen: Blood   Result Value Ref Range    TSH 3.600 0.270 - 4.200 uIU/mL        Problems Addressed this Visit        Cardiac and Vasculature    Hypertension    Relevant Medications    losartan (COZAAR) 50 MG tablet       Endocrine and Metabolic    Hypothyroidism    Relevant Medications    Thyroid (ARMOUR THYROID) 60 MG PO tablet    Other Relevant Orders    TSH Rfx On Abnormal To Free T4       Health Encounters    Well adult exam - Primary     The patient is here for health maintenance visit.  Currently, the patient consumes a healthy diet and has an adequate exercise regimen.  Screening lab work is ordered.   Immunizations were reviewed today.  Advice and education was given regarding nutrition, aerobic exercise, routine dental evaluations, routine eye exams, reproductive health, cardiovascular risk reduction, sunscreen use, self skin examination (annual dermatology evaluations) and seatbelt use (general overall safety).  Further recommendations will be given if needed after lab evaluation.  Annual wellness evaluation is recommended.           Relevant Orders    CBC & Differential    Comprehensive Metabolic Panel    Hemoglobin A1c    Lipid Panel    TSH Rfx On Abnormal To Free T4    Hepatitis C Antibody      Diagnoses       Codes Comments    Well adult exam    -  Primary ICD-10-CM: Z00.00  ICD-9-CM: V70.0     Essential hypertension     ICD-10-CM: I10  ICD-9-CM: 401.9     Acquired hypothyroidism     ICD-10-CM: E03.9  ICD-9-CM: 244.9           Return in about 6 months (around 10/23/2021) for Follow-up HTN, hypothyroidism.    Parts of this office note have been dictated by voice recognition software. Grammatical and/or spelling errors may be present.     Levon Kraus MD  4/23/2021

## 2021-04-24 LAB
T4 FREE SERPL-MCNC: 1.33 NG/DL (ref 0.93–1.7)
TSH SERPL DL<=0.05 MIU/L-ACNC: 4.64 UIU/ML (ref 0.27–4.2)

## 2021-06-25 DIAGNOSIS — I10 ESSENTIAL HYPERTENSION: ICD-10-CM

## 2021-06-25 RX ORDER — LOSARTAN POTASSIUM 50 MG/1
50 TABLET ORAL DAILY
Qty: 90 TABLET | Refills: 3 | Status: SHIPPED | OUTPATIENT
Start: 2021-06-25 | End: 2022-09-16 | Stop reason: SDUPTHER

## 2022-05-26 DIAGNOSIS — E03.9 ACQUIRED HYPOTHYROIDISM: ICD-10-CM

## 2022-05-26 DIAGNOSIS — I10 ESSENTIAL HYPERTENSION: ICD-10-CM

## 2022-05-26 RX ORDER — LOSARTAN POTASSIUM 50 MG/1
50 TABLET ORAL DAILY
Qty: 90 TABLET | Refills: 3 | OUTPATIENT
Start: 2022-05-26

## 2022-05-26 RX ORDER — LEVOTHYROXINE AND LIOTHYRONINE 38; 9 UG/1; UG/1
60 TABLET ORAL DAILY
Qty: 90 TABLET | Refills: 3 | OUTPATIENT
Start: 2022-05-26

## 2022-05-27 NOTE — TELEPHONE ENCOUNTER
LVM to return call    HUB CAN READ:  PLEASE SCHEDULE APPOINTMENT FOR A PHYSICAL AND DR. REES WILL REFILL

## 2022-09-16 ENCOUNTER — OFFICE VISIT (OUTPATIENT)
Dept: FAMILY MEDICINE CLINIC | Facility: CLINIC | Age: 67
End: 2022-09-16

## 2022-09-16 VITALS
HEIGHT: 70 IN | DIASTOLIC BLOOD PRESSURE: 92 MMHG | SYSTOLIC BLOOD PRESSURE: 138 MMHG | TEMPERATURE: 97 F | HEART RATE: 96 BPM | BODY MASS INDEX: 29.26 KG/M2 | WEIGHT: 204.4 LBS | OXYGEN SATURATION: 98 %

## 2022-09-16 DIAGNOSIS — I10 PRIMARY HYPERTENSION: Primary | ICD-10-CM

## 2022-09-16 DIAGNOSIS — E03.9 ACQUIRED HYPOTHYROIDISM: ICD-10-CM

## 2022-09-16 DIAGNOSIS — E78.2 MODERATE MIXED HYPERLIPIDEMIA NOT REQUIRING STATIN THERAPY: ICD-10-CM

## 2022-09-16 DIAGNOSIS — N18.31 STAGE 3A CHRONIC KIDNEY DISEASE: ICD-10-CM

## 2022-09-16 DIAGNOSIS — Z12.11 SCREENING FOR COLON CANCER: ICD-10-CM

## 2022-09-16 PROCEDURE — 99214 OFFICE O/P EST MOD 30 MIN: CPT | Performed by: FAMILY MEDICINE

## 2022-09-16 RX ORDER — NIACIN 500 MG/1
TABLET, EXTENDED RELEASE ORAL
COMMUNITY
Start: 2022-09-01

## 2022-09-16 RX ORDER — AMLODIPINE BESYLATE 2.5 MG/1
2.5 TABLET ORAL DAILY
Qty: 90 TABLET | Refills: 1 | Status: SHIPPED | OUTPATIENT
Start: 2022-09-16 | End: 2022-12-16 | Stop reason: SDUPTHER

## 2022-09-16 RX ORDER — LEVOTHYROXINE AND LIOTHYRONINE 38; 9 UG/1; UG/1
60 TABLET ORAL DAILY
Qty: 90 TABLET | Refills: 3 | Status: SHIPPED | OUTPATIENT
Start: 2022-09-16 | End: 2022-12-16 | Stop reason: SDUPTHER

## 2022-09-16 RX ORDER — LOSARTAN POTASSIUM 50 MG/1
50 TABLET ORAL DAILY
Qty: 90 TABLET | Refills: 3 | Status: SHIPPED | OUTPATIENT
Start: 2022-09-16 | End: 2022-12-16 | Stop reason: SDUPTHER

## 2022-09-16 NOTE — ASSESSMENT & PLAN NOTE
Chronic and stable.  On last labs TSH was slightly elevated but free T4 was in the normal range.  We will recheck labs today and make medication adjustments as indicated.  Patient was given refill on [facility].

## 2022-09-16 NOTE — ASSESSMENT & PLAN NOTE
Hypertension is Improving with treatment but still above goal of less than 130/80.  Dietary sodium restriction.  Weight loss.  Regular aerobic exercise.  Ambulatory blood pressure monitoring.  Patient will continue losartan and will add 2.5 mg of amlodipine daily.  Blood pressure will be reassessed in 3 months.

## 2022-09-16 NOTE — PROGRESS NOTES
Parker Rios is a 66 y.o. male who presents today for Hypertension (Med refill )      Patient is here to follow-up on HTN and hypothyroidism. He checks at home and has numbers in the 120-130/80-90 range. He takes losartan 50mg daily. He has no side effects from medication. He has taken HCTZ in the past but not both medications together. He has been taking armour thyroid daily. He has been taking before food but with all of his other medications. He is tolerating this well. He follows with urology for low testosterone. He has no acute complaints today.        Review of Systems   Constitutional: Negative for fever, malaise/fatigue and unexpected weight loss.   HENT: Negative for congestion, ear pain and sore throat.    Eyes: Negative for blurred vision and visual disturbance.   Respiratory: Negative for cough, shortness of breath and wheezing.    Cardiovascular: Negative for chest pain, palpitations, orthopnea and PND.   Gastrointestinal: Negative for abdominal pain, blood in stool, constipation, diarrhea, nausea, vomiting and GERD.   Endocrine: Negative for polydipsia and polyuria.   Genitourinary: Negative for difficulty urinating.   Musculoskeletal: Negative for joint swelling and neck pain.   Skin: Negative for rash and skin lesions.   Allergic/Immunologic: Negative for environmental allergies.   Neurological: Negative for seizures and syncope.   Hematological: Does not bruise/bleed easily.   Psychiatric/Behavioral: Negative for suicidal ideas.        The following portions of the patient's history were reviewed and updated as appropriate: allergies, current medications, past family history, past medical history, past social history, past surgical history and problem list.    Current Outpatient Medications on File Prior to Visit   Medication Sig Dispense Refill   • anastrozole (ARIMIDEX) 1 MG tablet Take 0.5 mg by mouth 2 (Two) Times a Week.     • finasteride (PROSCAR) 5 MG tablet Take 5 mg by mouth Daily. as  "directed     • Testosterone (TESTOPEL) 75 MG implant pellet by Implant route 1 (One) Time.     • tretinoin (RETIN-A) 0.05 % cream Apply  topically to the appropriate area as directed Every Night. 45 g 5   • [DISCONTINUED] losartan (COZAAR) 50 MG tablet Take 1 tablet by mouth Daily. 90 tablet 3   • [DISCONTINUED] Thyroid (ARMOUR THYROID) 60 MG PO tablet Take 1 tablet by mouth Daily. 90 tablet 3     No current facility-administered medications on file prior to visit.       No Known Allergies     Visit Vitals  /92 (BP Location: Left arm, Patient Position: Sitting, Cuff Size: Large Adult)   Pulse 96   Temp 97 °F (36.1 °C)   Ht 177.8 cm (70\")   Wt 92.7 kg (204 lb 6.4 oz)   SpO2 98%   BMI 29.33 kg/m²        Physical Exam  Constitutional:       General: He is not in acute distress.     Appearance: He is well-developed. He is not diaphoretic.   HENT:      Head: Atraumatic.   Cardiovascular:      Rate and Rhythm: Normal rate and regular rhythm.      Heart sounds: Normal heart sounds. No murmur heard.    No friction rub. No gallop.   Pulmonary:      Effort: Pulmonary effort is normal. No respiratory distress.      Breath sounds: Normal breath sounds. No stridor. No wheezing, rhonchi or rales.   Abdominal:      General: Bowel sounds are normal. There is no distension.      Palpations: Abdomen is soft. There is no mass.      Tenderness: There is no abdominal tenderness. There is no guarding or rebound.      Hernia: No hernia is present.   Musculoskeletal:      Cervical back: Normal range of motion and neck supple.   Skin:     General: Skin is warm and dry.   Neurological:      Mental Status: He is alert and oriented to person, place, and time.   Psychiatric:         Behavior: Behavior normal.          Results for orders placed or performed in visit on 04/23/21   Comprehensive Metabolic Panel    Specimen: Arm, Left; Blood   Result Value Ref Range    Glucose 106 (H) 65 - 99 mg/dL    BUN 14 8 - 23 mg/dL    Creatinine 1.44 " (H) 0.76 - 1.27 mg/dL    Sodium 139 136 - 145 mmol/L    Potassium 5.1 3.5 - 5.2 mmol/L    Chloride 101 98 - 107 mmol/L    CO2 27.2 22.0 - 29.0 mmol/L    Calcium 10.0 8.6 - 10.5 mg/dL    Total Protein 6.8 6.0 - 8.5 g/dL    Albumin 4.20 3.50 - 5.20 g/dL    ALT (SGPT) 48 (H) 1 - 41 U/L    AST (SGOT) 36 1 - 40 U/L    Alkaline Phosphatase 79 39 - 117 U/L    Total Bilirubin 0.7 0.0 - 1.2 mg/dL    eGFR Non African Amer 50 (L) >60 mL/min/1.73    Globulin 2.6 gm/dL    A/G Ratio 1.6 g/dL    BUN/Creatinine Ratio 9.7 7.0 - 25.0    Anion Gap 10.8 5.0 - 15.0 mmol/L   Hemoglobin A1c    Specimen: Arm, Left; Blood   Result Value Ref Range    Hemoglobin A1C 5.43 4.80 - 5.60 %   Lipid Panel    Specimen: Arm, Left; Blood   Result Value Ref Range    Total Cholesterol 183 0 - 200 mg/dL    Triglycerides 137 0 - 150 mg/dL    HDL Cholesterol 51 40 - 60 mg/dL    LDL Cholesterol  108 (H) 0 - 100 mg/dL    VLDL Cholesterol 24 5 - 40 mg/dL    LDL/HDL Ratio 2.05    TSH Rfx On Abnormal To Free T4    Specimen: Arm, Left; Blood   Result Value Ref Range    TSH 4.640 (H) 0.270 - 4.200 uIU/mL   Hepatitis C Antibody    Specimen: Arm, Left; Blood   Result Value Ref Range    Hepatitis C Ab Non-Reactive Non-Reactive   CBC Auto Differential    Specimen: Arm, Left; Blood   Result Value Ref Range    WBC 4.43 3.40 - 10.80 10*3/mm3    RBC 5.42 4.14 - 5.80 10*6/mm3    Hemoglobin 18.4 (H) 13.0 - 17.7 g/dL    Hematocrit 53.0 (H) 37.5 - 51.0 %    MCV 97.8 (H) 79.0 - 97.0 fL    MCH 33.9 (H) 26.6 - 33.0 pg    MCHC 34.7 31.5 - 35.7 g/dL    RDW 12.5 12.3 - 15.4 %    RDW-SD 44.2 37.0 - 54.0 fl    MPV 10.5 6.0 - 12.0 fL    Platelets 196 140 - 450 10*3/mm3    Neutrophil % 56.6 42.7 - 76.0 %    Lymphocyte % 25.5 19.6 - 45.3 %    Monocyte % 13.3 (H) 5.0 - 12.0 %    Eosinophil % 2.5 0.3 - 6.2 %    Basophil % 1.4 0.0 - 1.5 %    Immature Grans % 0.7 (H) 0.0 - 0.5 %    Neutrophils, Absolute 2.51 1.70 - 7.00 10*3/mm3    Lymphocytes, Absolute 1.13 0.70 - 3.10 10*3/mm3     Monocytes, Absolute 0.59 0.10 - 0.90 10*3/mm3    Eosinophils, Absolute 0.11 0.00 - 0.40 10*3/mm3    Basophils, Absolute 0.06 0.00 - 0.20 10*3/mm3    Immature Grans, Absolute 0.03 0.00 - 0.05 10*3/mm3    nRBC 0.0 0.0 - 0.2 /100 WBC   T4, Free    Specimen: Arm, Left; Blood   Result Value Ref Range    Free T4 1.33 0.93 - 1.70 ng/dL        Problems Addressed this Visit        Cardiac and Vasculature    Hypertension - Primary     Hypertension is Improving with treatment but still above goal of less than 130/80.  Dietary sodium restriction.  Weight loss.  Regular aerobic exercise.  Ambulatory blood pressure monitoring.  Patient will continue losartan and will add 2.5 mg of amlodipine daily.  Blood pressure will be reassessed in 3 months.         Relevant Medications    losartan (COZAAR) 50 MG tablet    amLODIPine (NORVASC) 2.5 MG tablet    Other Relevant Orders    Comprehensive Metabolic Panel    TSH Rfx On Abnormal To Free T4    CBC & Differential    Lipid Panel    Moderate mixed hyperlipidemia not requiring statin therapy    Relevant Orders    Comprehensive Metabolic Panel    CBC & Differential    Lipid Panel       Endocrine and Metabolic    Hypothyroidism     Chronic and stable.  On last labs TSH was slightly elevated but free T4 was in the normal range.  We will recheck labs today and make medication adjustments as indicated.  Patient was given refill on [place] Thyroid.         Relevant Medications    Thyroid (ARMOUR THYROID) 60 MG PO tablet    Other Relevant Orders    TSH Rfx On Abnormal To Free T4       Gastrointestinal Abdominal     Screening for colon cancer    Relevant Orders    Ambulatory Referral For Screening Colonoscopy       Genitourinary and Reproductive     Stage 3a chronic kidney disease (HCC)    Relevant Orders    Comprehensive Metabolic Panel      Diagnoses       Codes Comments    Primary hypertension    -  Primary ICD-10-CM: I10  ICD-9-CM: 401.9     Acquired hypothyroidism     ICD-10-CM:  E03.9  ICD-9-CM: 244.9     Moderate mixed hyperlipidemia not requiring statin therapy     ICD-10-CM: E78.2  ICD-9-CM: 272.2     Stage 3a chronic kidney disease (HCC)     ICD-10-CM: N18.31  ICD-9-CM: 585.3     Screening for colon cancer     ICD-10-CM: Z12.11  ICD-9-CM: V76.51           Return in about 3 months (around 12/16/2022) for Annual.    Levon rKaus MD   9/16/2022

## 2022-12-16 ENCOUNTER — OFFICE VISIT (OUTPATIENT)
Dept: FAMILY MEDICINE CLINIC | Facility: CLINIC | Age: 67
End: 2022-12-16

## 2022-12-16 ENCOUNTER — LAB (OUTPATIENT)
Dept: LAB | Facility: HOSPITAL | Age: 67
End: 2022-12-16

## 2022-12-16 VITALS
WEIGHT: 200 LBS | HEIGHT: 70 IN | DIASTOLIC BLOOD PRESSURE: 82 MMHG | TEMPERATURE: 97 F | BODY MASS INDEX: 28.63 KG/M2 | HEART RATE: 80 BPM | SYSTOLIC BLOOD PRESSURE: 140 MMHG | OXYGEN SATURATION: 98 %

## 2022-12-16 DIAGNOSIS — Z12.5 PROSTATE CANCER SCREENING: ICD-10-CM

## 2022-12-16 DIAGNOSIS — E78.2 MODERATE MIXED HYPERLIPIDEMIA NOT REQUIRING STATIN THERAPY: ICD-10-CM

## 2022-12-16 DIAGNOSIS — I10 PRIMARY HYPERTENSION: ICD-10-CM

## 2022-12-16 DIAGNOSIS — E03.9 ACQUIRED HYPOTHYROIDISM: ICD-10-CM

## 2022-12-16 DIAGNOSIS — R94.31 LEFT AXIS DEVIATION: ICD-10-CM

## 2022-12-16 DIAGNOSIS — Z00.00 WELCOME TO MEDICARE PREVENTIVE VISIT: Primary | ICD-10-CM

## 2022-12-16 DIAGNOSIS — Z00.00 WELCOME TO MEDICARE PREVENTIVE VISIT: ICD-10-CM

## 2022-12-16 LAB
ALBUMIN SERPL-MCNC: 4.5 G/DL (ref 3.5–5.2)
ALBUMIN/GLOB SERPL: 1.8 G/DL
ALP SERPL-CCNC: 81 U/L (ref 39–117)
ALT SERPL W P-5'-P-CCNC: 37 U/L (ref 1–41)
ANION GAP SERPL CALCULATED.3IONS-SCNC: 12.7 MMOL/L (ref 5–15)
AST SERPL-CCNC: 34 U/L (ref 1–40)
BASOPHILS # BLD AUTO: 0.05 10*3/MM3 (ref 0–0.2)
BASOPHILS NFR BLD AUTO: 0.9 % (ref 0–1.5)
BILIRUB SERPL-MCNC: 0.8 MG/DL (ref 0–1.2)
BUN SERPL-MCNC: 13 MG/DL (ref 8–23)
BUN/CREAT SERPL: 9.4 (ref 7–25)
CALCIUM SPEC-SCNC: 9.6 MG/DL (ref 8.6–10.5)
CHLORIDE SERPL-SCNC: 99 MMOL/L (ref 98–107)
CHOLEST SERPL-MCNC: 214 MG/DL (ref 0–200)
CO2 SERPL-SCNC: 28.3 MMOL/L (ref 22–29)
CREAT SERPL-MCNC: 1.38 MG/DL (ref 0.76–1.27)
DEPRECATED RDW RBC AUTO: 47.8 FL (ref 37–54)
EGFRCR SERPLBLD CKD-EPI 2021: 56.7 ML/MIN/1.73
EOSINOPHIL # BLD AUTO: 0.14 10*3/MM3 (ref 0–0.4)
EOSINOPHIL NFR BLD AUTO: 2.6 % (ref 0.3–6.2)
ERYTHROCYTE [DISTWIDTH] IN BLOOD BY AUTOMATED COUNT: 13.7 % (ref 12.3–15.4)
GLOBULIN UR ELPH-MCNC: 2.5 GM/DL
GLUCOSE SERPL-MCNC: 134 MG/DL (ref 65–99)
HCT VFR BLD AUTO: 51.3 % (ref 37.5–51)
HDLC SERPL-MCNC: 59 MG/DL (ref 40–60)
HGB BLD-MCNC: 17.7 G/DL (ref 13–17.7)
IMM GRANULOCYTES # BLD AUTO: 0.03 10*3/MM3 (ref 0–0.05)
IMM GRANULOCYTES NFR BLD AUTO: 0.6 % (ref 0–0.5)
LDLC SERPL CALC-MCNC: 125 MG/DL (ref 0–100)
LDLC/HDLC SERPL: 2.05 {RATIO}
LYMPHOCYTES # BLD AUTO: 1.09 10*3/MM3 (ref 0.7–3.1)
LYMPHOCYTES NFR BLD AUTO: 20.1 % (ref 19.6–45.3)
MCH RBC QN AUTO: 32.8 PG (ref 26.6–33)
MCHC RBC AUTO-ENTMCNC: 34.5 G/DL (ref 31.5–35.7)
MCV RBC AUTO: 95 FL (ref 79–97)
MONOCYTES # BLD AUTO: 0.72 10*3/MM3 (ref 0.1–0.9)
MONOCYTES NFR BLD AUTO: 13.3 % (ref 5–12)
NEUTROPHILS NFR BLD AUTO: 3.38 10*3/MM3 (ref 1.7–7)
NEUTROPHILS NFR BLD AUTO: 62.5 % (ref 42.7–76)
NRBC BLD AUTO-RTO: 0 /100 WBC (ref 0–0.2)
PLATELET # BLD AUTO: 186 10*3/MM3 (ref 140–450)
PMV BLD AUTO: 10.3 FL (ref 6–12)
POTASSIUM SERPL-SCNC: 4.5 MMOL/L (ref 3.5–5.2)
PROT SERPL-MCNC: 7 G/DL (ref 6–8.5)
RBC # BLD AUTO: 5.4 10*6/MM3 (ref 4.14–5.8)
SODIUM SERPL-SCNC: 140 MMOL/L (ref 136–145)
T4 FREE SERPL-MCNC: 1.31 NG/DL (ref 0.93–1.7)
TRIGL SERPL-MCNC: 171 MG/DL (ref 0–150)
TSH SERPL DL<=0.05 MIU/L-ACNC: 4.66 UIU/ML (ref 0.27–4.2)
VLDLC SERPL-MCNC: 30 MG/DL (ref 5–40)
WBC NRBC COR # BLD: 5.41 10*3/MM3 (ref 3.4–10.8)

## 2022-12-16 PROCEDURE — 80061 LIPID PANEL: CPT

## 2022-12-16 PROCEDURE — 1170F FXNL STATUS ASSESSED: CPT | Performed by: FAMILY MEDICINE

## 2022-12-16 PROCEDURE — G0403 EKG FOR INITIAL PREVENT EXAM: HCPCS | Performed by: FAMILY MEDICINE

## 2022-12-16 PROCEDURE — 1159F MED LIST DOCD IN RCRD: CPT | Performed by: FAMILY MEDICINE

## 2022-12-16 PROCEDURE — G0402 INITIAL PREVENTIVE EXAM: HCPCS | Performed by: FAMILY MEDICINE

## 2022-12-16 PROCEDURE — 1126F AMNT PAIN NOTED NONE PRSNT: CPT | Performed by: FAMILY MEDICINE

## 2022-12-16 PROCEDURE — 84443 ASSAY THYROID STIM HORMONE: CPT

## 2022-12-16 PROCEDURE — 80053 COMPREHEN METABOLIC PANEL: CPT

## 2022-12-16 PROCEDURE — 84439 ASSAY OF FREE THYROXINE: CPT

## 2022-12-16 PROCEDURE — 85025 COMPLETE CBC W/AUTO DIFF WBC: CPT

## 2022-12-16 RX ORDER — AMLODIPINE BESYLATE 5 MG/1
5 TABLET ORAL DAILY
Qty: 90 TABLET | Refills: 3 | Status: SHIPPED | OUTPATIENT
Start: 2022-12-16

## 2022-12-16 RX ORDER — LOSARTAN POTASSIUM 50 MG/1
50 TABLET ORAL DAILY
Qty: 90 TABLET | Refills: 3 | Status: SHIPPED | OUTPATIENT
Start: 2022-12-16

## 2022-12-16 RX ORDER — LEVOTHYROXINE AND LIOTHYRONINE 38; 9 UG/1; UG/1
60 TABLET ORAL DAILY
Qty: 90 TABLET | Refills: 3 | Status: SHIPPED | OUTPATIENT
Start: 2022-12-16 | End: 2022-12-19 | Stop reason: SDUPTHER

## 2022-12-16 NOTE — PATIENT INSTRUCTIONS
Advance Care Planning and Advance Directives     You make decisions on a daily basis - decisions about where you want to live, your career, your home, your life. Perhaps one of the most important decisions you face is your choice for future medical care. Take time to talk with your family and your healthcare team and start planning today.  Advance Care Planning is a process that can help you:  Understand possible future healthcare decisions in light of your own experiences  Reflect on those decision in light of your goals and values  Discuss your decisions with those closest to you and the healthcare professionals that care for you  Make a plan by creating a document that reflects your wishes    Surrogate Decision Maker  In the event of a medical emergency, which has left you unable to communicate or to make your own decisions, you would need someone to make decisions for you.  It is important to discuss your preferences for medical treatment with this person while you are in good health.     Qualities of a surrogate decision maker:  Willing to take on this role and responsibility  Knows what you want for future medical care  Willing to follow your wishes even if they don't agree with them  Able to make difficult medical decisions under stressful circumstances    Advance Directives  These are legal documents you can create that will guide your healthcare team and decision maker(s) when needed. These documents can be stored in the electronic medical record.    Living Will - a legal document to guide your care if you have a terminal condition or a serious illness and are unable to communicate. States vary by statute in document names/types, but most forms may include one or more of the following:        -  Directions regarding life-prolonging treatments        -  Directions regarding artificially provided nutrition/hydration        -  Choosing a healthcare decision maker        -  Direction regarding organ/tissue  donation    Durable Power of  for Healthcare - this document names an -in-fact to make medical decisions for you, but it may also allow this person to make personal and financial decisions for you. Please seek the advice of an  if you need this type of document.    **Advance Directives are not required and no one may discriminate against you if you do not sign one.    Medical Orders  Many states allow specific forms/orders signed by your physician to record your wishes for medical treatment in your current state of health. This form, signed in personal communication with your physician, addresses resuscitation and other medical interventions that you may or may not want.      For more information or to schedule a time with a [facility] Advance Care Planning Facilitator contact: [facility] or call [phone] and someone will contact you directly.Advance Directive  Advance directives are legal documents that allow you to make decisions about your health care and medical treatment in case you become unable to communicate for yourself. Advance directives let your wishes be known to family, friends, and health care providers.  Discussing and writing advance directives should happen over time rather than all at once. Advance directives can be changed and updated at any time. There are different types of advance directives, such as:  Medical power of .  Living will.  Do not resuscitate (DNR) order or do not attempt resuscitation (DNAR) order.  Health care proxy and medical power of   A health care proxy is also called a health care agent. This person is appointed to make medical decisions for you when you are unable to make decisions for yourself. Generally, people ask a trusted friend or family member to act as their proxy and represent their preferences. Make sure you have an agreement with your trusted person to act as your proxy. A proxy may have to make a  medical decision on your behalf if your wishes are not known.  A medical power of , also called a durable power of  for health care, is a legal document that names your health care proxy. Depending on the laws in your state, the document may need to be:  Signed.  Notarized.  Dated.  Copied.  Witnessed.  Incorporated into your medical record.  You may also want to appoint a trusted person to manage your money in the event you are unable to do so. This is called a durable power of  for finances. It is a separate legal document from the durable power of  for health care. You may choose your health care proxy or someone different to act as your agent in money matters.  If you do not appoint a proxy, or there is a concern that the proxy is not acting in your best interest, a court may appoint a guardian to act on your behalf.  Living will  A living will is a set of instructions that state your wishes about medical care when you cannot express them yourself. Health care providers should keep a copy of your living will in your medical record. You may want to give a copy to family members or friends. To alert caregivers in case of an emergency, you can place a card in your wallet to let them know that you have a living will and where they can find it. A living will is used if you become:  Terminally ill.  Disabled.  Unable to communicate or make decisions.  The following decisions should be included in your living will:  To use or not to use life support equipment, such as dialysis machines and breathing machines (ventilators).  Whether you want a DNR or DNAR order. This tells health care providers not to use cardiopulmonary resuscitation (CPR) if breathing or heartbeat stops.  To use or not to use tube feeding.  To be given or not to be given food and fluids.  Whether you want comfort (palliative) care when the goal becomes comfort rather than a cure.  Whether you want to donate your organs  and tissues.  A living will does not give instructions for distributing your money and property if you should pass away.  DNR or DNAR  A DNR or DNAR order is a request not to have CPR in the event that your heart stops beating or you stop breathing. If a DNR or DNAR order has not been made and shared, a health care provider will try to help any patient whose heart has stopped or who has stopped breathing. If you plan to have surgery, talk with your health care provider about how your DNR or DNAR order will be followed if problems occur.  What if I do not have an advance directive?  Some states assign family decision makers to act on your behalf if you do not have an advance directive. Each state has its own laws about advance directives. You may want to check with your health care provider, , or state representative about the laws in your state.  Summary  Advance directives are legal documents that allow you to make decisions about your health care and medical treatment in case you become unable to communicate for yourself.  The process of discussing and writing advance directives should happen over time. You can change and update advance directives at any time.  Advance directives may include a medical power of , a living will, and a DNR or DNAR order.  This information is not intended to replace advice given to you by your health care provider. Make sure you discuss any questions you have with your health care provider.  Document Revised: 09/21/2021 Document Reviewed: 09/21/2021  [employer] Patient Education © 2022 [employer]  Hypertension, Adult  High blood pressure (hypertension) is when the force of blood pumping through the arteries is too strong. The arteries are the blood vessels that carry blood from the heart throughout the body. Hypertension forces the heart to work harder to pump blood and may cause arteries to become narrow or stiff. Untreated or uncontrolled hypertension can cause a  "heart attack, heart failure, a stroke, kidney disease, and other problems.  A blood pressure reading consists of a higher number over a lower number. Ideally, your blood pressure should be below 120/80. The first (\"top\") number is called the systolic pressure. It is a measure of the pressure in your arteries as your heart beats. The second (\"bottom\") number is called the diastolic pressure. It is a measure of the pressure in your arteries as the heart relaxes.  What are the causes?  The exact cause of this condition is not known. There are some conditions that result in or are related to high blood pressure.  What increases the risk?  Some risk factors for high blood pressure are under your control. The following factors may make you more likely to develop this condition:  Smoking.  Having type 2 diabetes mellitus, high cholesterol, or both.  Not getting enough exercise or physical activity.  Being overweight.  Having too much fat, sugar, calories, or salt (sodium) in your diet.  Drinking too much alcohol.  Some risk factors for high blood pressure may be difficult or impossible to change. Some of these factors include:  Having chronic kidney disease.  Having a family history of high blood pressure.  Age. Risk increases with age.  Race. You may be at higher risk if you are .  Gender. Men are at higher risk than women before age 45. After age 65, women are at higher risk than men.  Having obstructive sleep apnea.  Stress.  What are the signs or symptoms?  High blood pressure may not cause symptoms. Very high blood pressure (hypertensive crisis) may cause:  Headache.  Anxiety.  Shortness of breath.  Nosebleed.  Nausea and vomiting.  Vision changes.  Severe chest pain.  Seizures.  How is this diagnosed?  This condition is diagnosed by measuring your blood pressure while you are seated, with your arm resting on a flat surface, your legs uncrossed, and your feet flat on the floor. The cuff of the blood " pressure monitor will be placed directly against the skin of your upper arm at the level of your heart. It should be measured at least twice using the same arm. Certain conditions can cause a difference in blood pressure between your right and left arms.  Certain factors can cause blood pressure readings to be lower or higher than normal for a short period of time:  When your blood pressure is higher when you are in a health care provider's office than when you are at home, this is called white coat hypertension. Most people with this condition do not need medicines.  When your blood pressure is higher at home than when you are in a health care provider's office, this is called masked hypertension. Most people with this condition may need medicines to control blood pressure.  If you have a high blood pressure reading during one visit or you have normal blood pressure with other risk factors, you may be asked to:  Return on a different day to have your blood pressure checked again.  Monitor your blood pressure at home for 1 week or longer.  If you are diagnosed with hypertension, you may have other blood or imaging tests to help your health care provider understand your overall risk for other conditions.  How is this treated?  This condition is treated by making healthy lifestyle changes, such as eating healthy foods, exercising more, and reducing your alcohol intake. Your health care provider may prescribe medicine if lifestyle changes are not enough to get your blood pressure under control, and if:  Your systolic blood pressure is above 130.  Your diastolic blood pressure is above 80.  Your personal target blood pressure may vary depending on your medical conditions, your age, and other factors.  Follow these instructions at home:  Eating and drinking    Eat a diet that is high in fiber and potassium, and low in sodium, added sugar, and fat. An example eating plan is called the DASH (Dietary Approaches to Stop  Hypertension) diet. To eat this way:  Eat plenty of fresh fruits and vegetables. Try to fill one half of your plate at each meal with fruits and vegetables.  Eat whole grains, such as whole-wheat pasta, brown rice, or whole-grain bread. Fill about one fourth of your plate with whole grains.  Eat or drink low-fat dairy products, such as skim milk or low-fat yogurt.  Avoid fatty cuts of meat, processed or cured meats, and poultry with skin. Fill about one fourth of your plate with lean proteins, such as fish, chicken without skin, beans, eggs, or tofu.  Avoid pre-made and processed foods. These tend to be higher in sodium, added sugar, and fat.  Reduce your daily sodium intake. Most people with hypertension should eat less than 1,500 mg of sodium a day.  Do not drink alcohol if:  Your health care provider tells you not to drink.  You are pregnant, may be pregnant, or are planning to become pregnant.  If you drink alcohol:  Limit how much you use to:  0-1 drink a day for women.  0-2 drinks a day for men.  Be aware of how much alcohol is in your drink. In the [place] one drink equals one 12 oz bottle of beer (355 mL), one 5 oz glass of wine (148 mL), or one 1½ oz glass of hard liquor (44 mL).  Lifestyle    Work with your health care provider to maintain a healthy body weight or to lose weight. Ask what an ideal weight is for you.  Get at least 30 minutes of exercise most days of the week. Activities may include walking, swimming, or biking.  Include exercise to strengthen your muscles (resistance exercise), such as Pilates or lifting weights, as part of your weekly exercise routine. Try to do these types of exercises for 30 minutes at least 3 days a week.  Do not use any products that contain nicotine or tobacco, such as cigarettes, e-cigarettes, and chewing tobacco. If you need help quitting, ask your health care provider.  Monitor your blood pressure at home as told by your health care provider.  Keep all follow-up  visits as told by your health care provider. This is important.  Medicines  Take over-the-counter and prescription medicines only as told by your health care provider. Follow directions carefully. Blood pressure medicines must be taken as prescribed.  Do not skip doses of blood pressure medicine. Doing this puts you at risk for problems and can make the medicine less effective.  Ask your health care provider about side effects or reactions to medicines that you should watch for.  Contact a health care provider if you:  Think you are having a reaction to a medicine you are taking.  Have headaches that keep coming back (recurring).  Feel dizzy.  Have swelling in your ankles.  Have trouble with your vision.  Get help right away if you:  Develop a severe headache or confusion.  Have unusual weakness or numbness.  Feel faint.  Have severe pain in your chest or abdomen.  Vomit repeatedly.  Have trouble breathing.  Summary  Hypertension is when the force of blood pumping through your arteries is too strong. If this condition is not controlled, it may put you at risk for serious complications.  Your personal target blood pressure may vary depending on your medical conditions, your age, and other factors. For most people, a normal blood pressure is less than 120/80.  Hypertension is treated with lifestyle changes, medicines, or a combination of both. Lifestyle changes include losing weight, eating a healthy, low-sodium diet, exercising more, and limiting alcohol.  This information is not intended to replace advice given to you by your health care provider. Make sure you discuss any questions you have with your health care provider.  Document Revised: 08/28/2019 Document Reviewed: 08/28/2019  [employer] Patient Education © 2022 [employer]  BMI for Adults  What is BMI?  Body mass index (BMI) is a number that is calculated from a person's weight and height. BMI can help estimate how much of a person's weight is composed of  "fat. BMI does not measure body fat directly. Rather, it is an alternative to procedures that directly measure body fat, which can be difficult and expensive.  BMI can help identify people who may be at higher risk for certain medical problems.  What are BMI measurements used for?  BMI is used as a screening tool to identify possible weight problems. It helps determine whether a person is obese, overweight, a healthy weight, or underweight.  BMI is useful for:  Identifying a weight problem that may be related to a medical condition or may increase the risk for medical problems.  Promoting changes, such as changes in diet and exercise, to help reach a healthy weight. BMI screening can be repeated to see if these changes are working.  How is BMI calculated?  BMI involves measuring your weight in relation to your height. Both height and weight are measured, and the BMI is calculated from those numbers. This can be done either in English ([place] or metric measurements. Note that charts and online BMI calculators are available to help you find your BMI quickly and easily without having to do these calculations yourself.  To calculate your BMI in English ([place] measurements:    Measure your weight in pounds (lb).  Multiply the number of pounds by 703.  For example, for a person who weighs 180 lb, multiply that number by 703, which equals [phone],540.  Measure your height in inches. Then multiply that number by itself to get a measurement called \"inches squared.\"  For example, for a person who is 70 inches tall, the \"inches squared\" measurement is 70 inches x 70 inches, which equals 4,900 inches squared.  Divide the total from step 2 (number of lb x 703) by the total from step 3 (inches squared): 126,540 ÷ 4,900 = 25.8. This is your BMI.  To calculate your BMI in metric measurements:  Measure your weight in kilograms (kg).  Measure your height in meters (m). Then multiply that number by itself to get a measurement called \"meters " "squared.\"  For example, for a person who is 1.75 m tall, the \"meters squared\" measurement is 1.75 m x 1.75 m, which is equal to 3.1 meters squared.  Divide the number of kilograms (your weight) by the meters squared number. In this example: 70 ÷ 3.1 = 22.6. This is your BMI.  What do the results mean?  BMI charts are used to identify whether you are underweight, normal weight, overweight, or obese. The following guidelines will be used:  Underweight: BMI less than 18.5.  Normal weight: BMI between 18.5 and 24.9.  Overweight: BMI between 25 and 29.9.  Obese: BMI of 30 or above.  Keep these notes in mind:  Weight includes both fat and muscle, so someone with a muscular build, such as an athlete, may have a BMI that is higher than 24.9. In cases like these, BMI is not an accurate measure of body fat.  To determine if excess body fat is the cause of a BMI of 25 or higher, further assessments may need to be done by a health care provider.  BMI is usually interpreted in the same way for men and women.  Where to find more information  For more information about BMI, including tools to quickly calculate your BMI, go to these websites:  Centers for Disease Control and Prevention: www.cdc.gov  American Heart Association: www.heart.org  National Heart, Lung, and [employer] [place]: www.nhlbi.nih.gov  Summary  Body mass index (BMI) is a number that is calculated from a person's weight and height.  BMI may help estimate how much of a person's weight is composed of fat. BMI can help identify those who may be at higher risk for certain medical problems.  BMI can be measured using English measurements or metric measurements.  BMI charts are used to identify whether you are underweight, normal weight, overweight, or obese.  This information is not intended to replace advice given to you by your health care provider. Make sure you discuss any questions you have with your health care provider.  Document Revised: 09/09/2020 Document " Reviewed: 07/17/2020  [employer] Patient Education © 2022 [employer]

## 2022-12-16 NOTE — PROGRESS NOTES
The ABCs of the Annual Wellness Visit  [facility]    Subjective   Parker Rios is a 67 y.o. male who presents for a  Welcome to Medicare Visit.    The following portions of the patient's history were reviewed and   updated as appropriate: allergies, current medications, past family history, past medical history, past social history, past surgical history and problem list.     Compared to one year ago, the patient feels his physical   health is the same.    Compared to one year ago, the patient feels his mental   health is better.    Review of Systems   Constitutional: Negative for activity change, appetite change, fatigue, fever and unexpected weight change.   HENT: Negative for congestion, ear pain and sore throat.    Eyes: Negative for visual disturbance.   Respiratory: Negative for cough, shortness of breath and wheezing.    Cardiovascular: Negative for chest pain, palpitations and leg swelling.   Gastrointestinal: Negative for abdominal pain, blood in stool, constipation, diarrhea, nausea and vomiting.   Endocrine: Negative for polydipsia and polyuria.   Genitourinary: Negative for dysuria and hematuria.   Musculoskeletal: Negative for arthralgias, back pain, myalgias and neck pain.   Skin: Negative for rash.   Allergic/Immunologic: Negative for immunocompromised state.   Neurological: Negative for dizziness, seizures, syncope, weakness and headaches.   Hematological: Does not bruise/bleed easily.   Psychiatric/Behavioral: Negative for hallucinations, self-injury and suicidal ideas. The patient is not nervous/anxious.        Recent Hospitalizations:  He was not admitted to the hospital during the last year.       Current Medical Providers:  Patient Care Team:  Levon Kraus MD as PCP - General (Family Medicine)  Piotr Lobo MD as Emergency Attending (Emergency Medicine)    Outpatient Medications Prior to Visit   Medication Sig Dispense Refill   • anastrozole (ARIMIDEX) 1 MG tablet Take  "0.5 mg by mouth 2 (Two) Times a Week.     • finasteride (PROSCAR) 5 MG tablet Take 5 mg by mouth Daily. as directed     • niacin (NIASPAN) 500 MG CR tablet TAKE 1 TABLET BY MOUTH AT BEDTIME FOR 4 WEEKS, THEN INCREASE TO 2 TABS AT BEDTIME THEREAFTER     • Testosterone (TESTOPEL) 75 MG implant pellet by Implant route 1 (One) Time.     • tretinoin (RETIN-A) 0.05 % cream Apply  topically to the appropriate area as directed Every Night. 45 g 5   • amLODIPine (NORVASC) 2.5 MG tablet Take 1 tablet by mouth Daily. 90 tablet 1   • losartan (COZAAR) 50 MG tablet Take 1 tablet by mouth Daily. 90 tablet 3   • Thyroid (ARMOUR THYROID) 60 MG PO tablet Take 1 tablet by mouth Daily. 90 tablet 3     No facility-administered medications prior to visit.       No opioid medication identified on active medication list. I have reviewed chart for other potential  high risk medication/s and harmful drug interactions in the elderly.          Aspirin is not on active medication list.  Aspirin use is not indicated based on review of current medical condition/s. Risk of harm outweighs potential benefits.  .    Patient Active Problem List   Diagnosis   • Low testosterone in male   • Vaccine refused by patient   • Elevated PSA   • Hypertension   • Hypothyroidism   • Moderate mixed hyperlipidemia not requiring statin therapy   • Renal insufficiency   • Well adult exam   • Other acne   • Stage 3a chronic kidney disease (HCC)   • Screening for colon cancer   • Welcome to Medicare preventive visit   • Prostate cancer screening   • Left axis deviation     Advance Care Planning  Advance Directive is not on file.  ACP discussion was held with the patient during this visit. Patient does not have an advance directive, information provided.       Objective   Vitals:    12/16/22 0945   BP: 140/82   Pulse: 80   Temp: 97 °F (36.1 °C)   SpO2: 98%   Weight: 90.7 kg (200 lb)   Height: 177.8 cm (70\")   PainSc: 0-No pain     Estimated body mass index is 28.7 " "kg/m² as calculated from the following:    Height as of this encounter: 177.8 cm (70\").    Weight as of this encounter: 90.7 kg (200 lb).    BMI is >= 25 and <30. (Overweight) The following options were offered after discussion;: weight loss educational material (shared in after visit summary), exercise counseling/recommendations and nutrition counseling/recommendations    Physical Exam  Constitutional:       General: He is not in acute distress.     Appearance: He is well-developed. He is not diaphoretic.   HENT:      Head: Atraumatic.   Cardiovascular:      Rate and Rhythm: Normal rate and regular rhythm.      Heart sounds: Normal heart sounds. No murmur heard.    No friction rub. No gallop.   Pulmonary:      Effort: Pulmonary effort is normal. No respiratory distress.      Breath sounds: Normal breath sounds. No stridor. No wheezing, rhonchi or rales.   Abdominal:      General: Bowel sounds are normal. There is no distension.      Palpations: Abdomen is soft. There is no mass.      Tenderness: There is no abdominal tenderness. There is no guarding or rebound.      Hernia: No hernia is present.   Musculoskeletal:      Cervical back: Normal range of motion and neck supple.   Skin:     General: Skin is warm and dry.   Neurological:      Mental Status: He is alert and oriented to person, place, and time.   Psychiatric:         Behavior: Behavior normal.       Does the patient have evidence of cognitive impairment?   No           ECG 12 Lead    Date/Time: 12/16/2022 10:18 AM  Performed by: Levon Kraus MD  Authorized by: Levon Kraus MD   Comparison: not compared with previous ECG   Previous ECG: no previous ECG available  Rhythm: sinus rhythm  Ectopy: infrequent PVCs  Rate: normal  Conduction: conduction normal  QRS axis: left  Other: no other findings    Clinical impression: abnormal EKG               HEALTH RISK ASSESSMENT    Smoking Status:  Social History     Tobacco Use   Smoking Status Never "   Smokeless Tobacco Never     Alcohol Consumption:  Social History     Substance and Sexual Activity   Alcohol Use Yes   • Alcohol/week: 14.0 standard drinks   • Types: 14 Shots of liquor per week    Comment: 12 total weekly        Fall Risk Screen:    KAMILLA Fall Risk Assessment was completed, and patient is at LOW risk for falls.Assessment completed on:12/16/2022    Depression Screen:   PHQ-2/PHQ-9 Depression Screening 12/16/2022   Retired PHQ-9 Total Score -   Retired Total Score -   Little Interest or Pleasure in Doing Things 0-->not at all   Feeling Down, Depressed or Hopeless 0-->not at all   PHQ-9: Brief Depression Severity Measure Score 0       Health Habits and Functional and Cognitive Screening:  Functional & Cognitive Status 12/16/2022   Do you have difficulty preparing food and eating? No   Do you have difficulty bathing yourself, getting dressed or grooming yourself? No   Do you have difficulty using the toilet? No   Do you have difficulty moving around from place to place? No   Do you have trouble with steps or getting out of a bed or a chair? No   Current Diet Well Balanced Diet   Dental Exam Up to date   Eye Exam Up to date   Exercise (times per week) 7 times per week   Current Exercises Include Weightlifting;Walking   Do you need help using the phone?  No   Are you deaf or do you have serious difficulty hearing?  No   Do you need help with transportation? No   Do you need help shopping? No   Do you need help preparing meals?  No   Do you need help with housework?  No   Do you need help with laundry? No   Do you need help taking your medications? No   Do you need help managing money? No   Do you ever drive or ride in a car without wearing a seat belt? No   Have you felt unusual stress, anger or loneliness in the last month? No   Who do you live with? Alone   If you need help, do you have trouble finding someone available to you? No   Have you been bothered in the last four weeks by sexual problems?  No   Do you have difficulty concentrating, remembering or making decisions? No       Visual Acuity:    No results found.    Age-appropriate Screening Schedule:  Refer to the list below for future screening recommendations based on patient's age, sex and/or medical conditions. Orders for these recommended tests are listed in the plan section. The patient has been provided with a written plan.    Health Maintenance   Topic Date Due   • LIPID PANEL  04/23/2022   • ZOSTER VACCINE (1 of 2) 12/16/2022 (Originally 11/24/2007)   • INFLUENZA VACCINE  03/31/2023 (Originally 8/1/2022)   • TDAP/TD VACCINES (1 - Tdap) 12/16/2023 (Originally 11/24/1976)        CMS Preventative Services Quick Reference  Risk Factors Identified During Encounter    Immunizations Discussed/Encouraged: Tdap, Influenza, Prevnar 20 (Pneumococcal 20-valent conjugate), Shingrix and COVID19  The above risks/problems have been discussed with the patient.  Pertinent information has been shared with the patient in the After Visit Summary.  Follow up plans and orders are seen below in the Assessment/Plan Section.    Diagnoses and all orders for this visit:    1. Welcome to Medicare preventive visit (Primary)  Assessment & Plan:  The patient is here for health maintenance visit.  Currently, the patient consumes a healthy diet and has an adequate exercise regimen.  Screening lab work is ordered.  Immunizations were reviewed today.  Advice and education was given regarding nutrition, aerobic exercise, routine dental evaluations, routine eye exams, reproductive health, cardiovascular risk reduction, sunscreen use, self skin examination (annual dermatology evaluations) and seatbelt use (general overall safety).  Further recommendations will be given if needed after lab evaluation.  Annual wellness evaluation is recommended.      Orders:  -     Comprehensive Metabolic Panel; Future  -     TSH Rfx On Abnormal To Free T4; Future  -     CBC & Differential; Future  -      Lipid Panel; Future  -     ECG 12 Lead    2. Moderate mixed hyperlipidemia not requiring statin therapy  -     Comprehensive Metabolic Panel; Future  -     CBC & Differential; Future  -     Lipid Panel; Future  -     ECG 12 Lead    3. Primary hypertension  -     Comprehensive Metabolic Panel; Future  -     TSH Rfx On Abnormal To Free T4; Future  -     CBC & Differential; Future  -     Lipid Panel; Future  -     ECG 12 Lead  -     amLODIPine (NORVASC) 5 MG tablet; Take 1 tablet by mouth Daily.  Dispense: 90 tablet; Refill: 3  -     losartan (COZAAR) 50 MG tablet; Take 1 tablet by mouth Daily.  Dispense: 90 tablet; Refill: 3  -     Ambulatory Referral to Cardiology    4. Acquired hypothyroidism  -     TSH Rfx On Abnormal To Free T4; Future  -     Thyroid (ARMOUR THYROID) 60 MG PO tablet; Take 1 tablet by mouth Daily.  Dispense: 90 tablet; Refill: 3    5. Prostate cancer screening    6. Left axis deviation  Assessment & Plan:  Currently asymptomatic. Patient give referral to cards for further eval.    Orders:  -     Ambulatory Referral to Cardiology      Follow Up:   Initial Medicare Visit in one year    An After Visit Summary and PPPS were made available to the patient.

## 2022-12-19 DIAGNOSIS — E03.9 ACQUIRED HYPOTHYROIDISM: ICD-10-CM

## 2022-12-19 RX ORDER — LEVOTHYROXINE AND LIOTHYRONINE 38; 9 UG/1; UG/1
60 TABLET ORAL DAILY
Qty: 90 TABLET | Refills: 3 | Status: SHIPPED | OUTPATIENT
Start: 2022-12-19

## 2022-12-19 NOTE — TELEPHONE ENCOUNTER
Rx Refill Note  Requested Prescriptions     Pending Prescriptions Disp Refills   • Thyroid (ARMOUR THYROID) 60 MG PO tablet 90 tablet 3     Sig: Take 1 tablet by mouth Daily.      Last office visit with prescribing clinician: 12/16/2022   Last telemedicine visit with prescribing clinician: Visit date not found   Next office visit with prescribing clinician: Visit date not found                         Would you like a call back once the refill request has been completed: [] Yes [] No    If the office needs to give you a call back, can they leave a voicemail: [] Yes [] No    Gera Ward MA  12/19/22, 09:39 EST

## 2023-01-05 ENCOUNTER — OFFICE VISIT (OUTPATIENT)
Dept: CARDIOLOGY | Facility: CLINIC | Age: 68
End: 2023-01-05
Payer: MEDICARE

## 2023-01-05 VITALS
WEIGHT: 198.4 LBS | DIASTOLIC BLOOD PRESSURE: 76 MMHG | OXYGEN SATURATION: 97 % | SYSTOLIC BLOOD PRESSURE: 120 MMHG | HEART RATE: 105 BPM | HEIGHT: 70 IN | BODY MASS INDEX: 28.4 KG/M2

## 2023-01-05 DIAGNOSIS — I49.3 PVC (PREMATURE VENTRICULAR CONTRACTION): Primary | ICD-10-CM

## 2023-01-05 PROCEDURE — 99204 OFFICE O/P NEW MOD 45 MIN: CPT | Performed by: PHYSICIAN ASSISTANT

## 2023-01-05 PROCEDURE — 93000 ELECTROCARDIOGRAM COMPLETE: CPT | Performed by: PHYSICIAN ASSISTANT

## 2023-01-05 NOTE — PROGRESS NOTES
[facility]   OFFICE NOTE      Parker Rios  1955  PCP: Levon Kraus MD    SUBJECTIVE:   Parker Rios is a 67 y.o. male seen for a follow up visit regarding the following:     CC:    HPI:   67 year recently retired  presents today for evaluation regarding abnormal EKG, asymptomatic PVCs.  Patient states he has only had 1 experience regarding cardiac issues which included an episode of chest pain that occurred 2017.  He states he actually did very heavy chest workout with weights started having the chest pain with the ER [facility].  At that time he was told his EKG is abnormal but he had negative troponin levels he never requiring further cardiac work-up as the symptoms presumed to be muscle skeletal in nature and this did resolve.  He continues to workout quite frequently for 5 days a week some cardio also lifting weights.  He has been working hard to maintain good blood pressure control.  He states recently he presented to his PCP for routine well visit as noted on EKG that he had a PVC as well as left axis deviation.  In view of this finding is recommended he have a cardiac evaluation.  From a standpoint of cardiac symptoms he denies having any chest pain or chest tightness suggesting angina pectoris.  He denies any dizziness near syncope or syncope.  He denies any worsening shortness of breath..       Cardiac PMH: (Old records have been reviewed and summarized below)  1. Asymptomatic PVC's  2. Abnormal EKG. left axis deviation poor R wave progression.  3. Noncardiac chest pain, ER visit [facility] 2017.  Negative cardiac work-up.  4. HTN: Controlled, Norvasc cozaar  5. OA right elbow  6. Thyroid disease chronic replacement  7. BPH  8. Hypogonadism; Testosterone, Dr. Piotr Lobo [facility].   9. HLD: .   10. Chronic kidney disease          Past Medical History, Past Surgical History, Family history, Social History, and  Medications were all reviewed with the patient today and updated as necessary.       Current Outpatient Medications:   •  amLODIPine (NORVASC) 5 MG tablet, Take 1 tablet by mouth Daily., Disp: 90 tablet, Rfl: 3  •  anastrozole (ARIMIDEX) 1 MG tablet, Take 0.5 mg by mouth 2 (Two) Times a Week., Disp: , Rfl:   •  finasteride (PROSCAR) 5 MG tablet, Take 5 mg by mouth Daily. as directed, Disp: , Rfl:   •  losartan (COZAAR) 50 MG tablet, Take 1 tablet by mouth Daily., Disp: 90 tablet, Rfl: 3  •  niacin (NIASPAN) 500 MG CR tablet, TAKE 1 TABLET BY MOUTH AT BEDTIME FOR 4 WEEKS, THEN INCREASE TO 2 TABS AT BEDTIME THEREAFTER, Disp: , Rfl:   •  Testosterone (TESTOPEL) 75 MG implant pellet, by Implant route 1 (One) Time., Disp: , Rfl:   •  Thyroid (ARMOUR THYROID) 60 MG PO tablet, Take 1 tablet by mouth Daily., Disp: 90 tablet, Rfl: 3  •  tretinoin (RETIN-A) 0.05 % cream, Apply  topically to the appropriate area as directed Every Night., Disp: 45 g, Rfl: 5      No Known Allergies      PHYSICAL EXAM:    /76 (BP Location: Right arm, Patient Position: Sitting)   Pulse 105   Ht 177.8 cm (70\")   Wt 90 kg (198 lb 6.4 oz)   SpO2 97%   BMI 28.47 kg/m²        Wt Readings from Last 5 Encounters:   01/05/23 90 kg (198 lb 6.4 oz)   12/16/22 90.7 kg (200 lb)   09/16/22 92.7 kg (204 lb 6.4 oz)   03/11/22 88.9 kg (196 lb)   04/23/21 91.4 kg (201 lb 9.6 oz)       BP Readings from Last 5 Encounters:   01/05/23 120/76   12/16/22 140/82   09/16/22 138/92   03/11/22 155/86   04/23/21 138/84       General appearance - Alert, well appearing, and in no distress   Mental status - Affect appropriate to mood.  Eyes - Sclerae anicteric,  ENMT - Hearing grossly normal bilaterally, Dental hygiene good.  Neck - Carotids upstroke normal bilaterally, no bruits, no JVD.  Resp - Clear to auscultation, no wheezes, rales or rhonchi, symmetric air entry.  Heart - Normal rate, regular rhythm, normal S1, S2, no murmurs, rubs, clicks or gallops.  GI -  Soft, nontender, nondistended, no masses or organomegaly.  Neurological - Grossly intact - normal speech, no focal findings  Musculoskeletal - No joint tenderness, deformity or swelling, no muscular tenderness noted.  Extremities - Peripheral pulses normal, no pedal edema, no clubbing or cyanosis.  Skin - Normal coloration and turgor.  Psych -  oriented to person, place, and time.    Medical problems and test results were reviewed with the patient today.     Recent Results (from the past 672 hour(s))   Comprehensive Metabolic Panel    Collection Time: 12/16/22 10:55 AM    Specimen: Blood   Result Value Ref Range    Glucose 134 (H) 65 - 99 mg/dL    BUN 13 8 - 23 mg/dL    Creatinine 1.38 (H) 0.76 - 1.27 mg/dL    Sodium 140 136 - 145 mmol/L    Potassium 4.5 3.5 - 5.2 mmol/L    Chloride 99 98 - 107 mmol/L    CO2 28.3 22.0 - 29.0 mmol/L    Calcium 9.6 8.6 - 10.5 mg/dL    Total Protein 7.0 6.0 - 8.5 g/dL    Albumin 4.50 3.50 - 5.20 g/dL    ALT (SGPT) 37 1 - 41 U/L    AST (SGOT) 34 1 - 40 U/L    Alkaline Phosphatase 81 39 - 117 U/L    Total Bilirubin 0.8 0.0 - 1.2 mg/dL    Globulin 2.5 gm/dL    A/G Ratio 1.8 g/dL    BUN/Creatinine Ratio 9.4 7.0 - 25.0    Anion Gap 12.7 5.0 - 15.0 mmol/L    eGFR 56.7 (L) >60.0 mL/min/1.73   TSH Rfx On Abnormal To Free T4    Collection Time: 12/16/22 10:55 AM    Specimen: Blood   Result Value Ref Range    TSH 4.660 (H) 0.270 - 4.200 uIU/mL   Lipid Panel    Collection Time: 12/16/22 10:55 AM    Specimen: Blood   Result Value Ref Range    Total Cholesterol 214 (H) 0 - 200 mg/dL    Triglycerides 171 (H) 0 - 150 mg/dL    HDL Cholesterol 59 40 - 60 mg/dL    LDL Cholesterol  125 (H) 0 - 100 mg/dL    VLDL Cholesterol 30 5 - 40 mg/dL    LDL/HDL Ratio 2.05    CBC Auto Differential    Collection Time: 12/16/22 10:55 AM    Specimen: Blood   Result Value Ref Range    WBC 5.41 3.40 - 10.80 10*3/mm3    RBC 5.40 4.14 - 5.80 10*6/mm3    Hemoglobin 17.7 13.0 - 17.7 g/dL    Hematocrit 51.3 (H) 37.5 - 51.0 %     MCV 95.0 79.0 - 97.0 fL    MCH 32.8 26.6 - 33.0 pg    MCHC 34.5 31.5 - 35.7 g/dL    RDW 13.7 12.3 - 15.4 %    RDW-SD 47.8 37.0 - 54.0 fl    MPV 10.3 6.0 - 12.0 fL    Platelets 186 140 - 450 10*3/mm3    Neutrophil % 62.5 42.7 - 76.0 %    Lymphocyte % 20.1 19.6 - 45.3 %    Monocyte % 13.3 (H) 5.0 - 12.0 %    Eosinophil % 2.6 0.3 - 6.2 %    Basophil % 0.9 0.0 - 1.5 %    Immature Grans % 0.6 (H) 0.0 - 0.5 %    Neutrophils, Absolute 3.38 1.70 - 7.00 10*3/mm3    Lymphocytes, Absolute 1.09 0.70 - 3.10 10*3/mm3    Monocytes, Absolute 0.72 0.10 - 0.90 10*3/mm3    Eosinophils, Absolute 0.14 0.00 - 0.40 10*3/mm3    Basophils, Absolute 0.05 0.00 - 0.20 10*3/mm3    Immature Grans, Absolute 0.03 0.00 - 0.05 10*3/mm3    nRBC 0.0 0.0 - 0.2 /100 WBC   T4, Free    Collection Time: 12/16/22 10:55 AM    Specimen: Blood   Result Value Ref Range    Free T4 1.31 0.93 - 1.70 ng/dL         EKG: (EKG has been independently visualized by me and summarized below)    ECG 12 Lead    Date/Time: 1/5/2023 4:00 PM  Performed by: Zuhair Sanchez PA  Authorized by: Zuhair Sanchez PA   Comparison: compared with previous ECG from 12/16/2022  Rhythm: sinus rhythm  Rate: normal  Conduction: conduction normal  ST Segments: ST segments normal  T Waves: T waves normal  QRS axis: left              ASSESSMENT   1. Abnormal EKG: Discussed findings and results with patient.  Left axis deviation poor R wave progression could be lead placement.  Nonspecific findings.  He is having no angina symptoms.  To rule any structural heart disease he would like to pursue an echocardiogram.  2. PVC's,  Asymptomatic rare nature.  We will do a 24-hour monitor determine burden.  3. HTN: Nearly at target, continue to focus on controlling with systolic blood pressure less than 130mmHg.     PLAN  · 24 hour monitor  · Echo   · Continue to focus on risk factor for cardiovascular disease diet exercise weight loss maintain good blood pressure control and cholesterol  control focus on LDL less than 100.  · Return for follow-up in 2 months      1/5/2023  16:02 EST    Electronically signed by SHANON Harris, 01/05/23, 4:02 PM EST.

## 2023-01-20 ENCOUNTER — HOSPITAL ENCOUNTER (OUTPATIENT)
Dept: CARDIOLOGY | Facility: HOSPITAL | Age: 68
Discharge: HOME | End: 2023-01-20
Admitting: PHYSICIAN ASSISTANT
Payer: MEDICARE

## 2023-01-20 DIAGNOSIS — I49.3 PVC (PREMATURE VENTRICULAR CONTRACTION): ICD-10-CM

## 2023-01-20 LAB
BH CV ECHO MEAS - AO MAX PG: 5.7 MMHG
BH CV ECHO MEAS - AO MEAN PG: 3.2 MMHG
BH CV ECHO MEAS - AO ROOT DIAM: 3.7 CM
BH CV ECHO MEAS - AO V2 MAX: 119.4 CM/SEC
BH CV ECHO MEAS - AO V2 VTI: 22.5 CM
BH CV ECHO MEAS - AVA(I,D): 2.6 CM2
BH CV ECHO MEAS - EDV(CUBED): 170.8 ML
BH CV ECHO MEAS - EDV(MOD-SP2): 143 ML
BH CV ECHO MEAS - EDV(MOD-SP4): 136 ML
BH CV ECHO MEAS - EF(MOD-BP): 48.3 %
BH CV ECHO MEAS - EF(MOD-SP2): 42.7 %
BH CV ECHO MEAS - EF(MOD-SP4): 52 %
BH CV ECHO MEAS - ESV(CUBED): 70 ML
BH CV ECHO MEAS - ESV(MOD-SP2): 81.9 ML
BH CV ECHO MEAS - ESV(MOD-SP4): 65.3 ML
BH CV ECHO MEAS - FS: 25.7 %
BH CV ECHO MEAS - IVS/LVPW: 1.16 CM
BH CV ECHO MEAS - IVSD: 1.04 CM
BH CV ECHO MEAS - LA DIMENSION: 3.6 CM
BH CV ECHO MEAS - LAT PEAK E' VEL: 10 CM/SEC
BH CV ECHO MEAS - LV DIASTOLIC VOL/BSA (35-75): 65.6 CM2
BH CV ECHO MEAS - LV MASS(C)D: 206.9 GRAMS
BH CV ECHO MEAS - LV MAX PG: 2.2 MMHG
BH CV ECHO MEAS - LV MEAN PG: 1.27 MMHG
BH CV ECHO MEAS - LV SYSTOLIC VOL/BSA (12-30): 31.5 CM2
BH CV ECHO MEAS - LV V1 MAX: 74.1 CM/SEC
BH CV ECHO MEAS - LV V1 VTI: 16.1 CM
BH CV ECHO MEAS - LVIDD: 5.5 CM
BH CV ECHO MEAS - LVIDS: 4.1 CM
BH CV ECHO MEAS - LVOT AREA: 3.6 CM2
BH CV ECHO MEAS - LVOT DIAM: 2.16 CM
BH CV ECHO MEAS - LVPWD: 0.9 CM
BH CV ECHO MEAS - MED PEAK E' VEL: 6 CM/SEC
BH CV ECHO MEAS - MV A MAX VEL: 75.4 CM/SEC
BH CV ECHO MEAS - MV DEC SLOPE: 481.9 CM/SEC2
BH CV ECHO MEAS - MV E MAX VEL: 48.8 CM/SEC
BH CV ECHO MEAS - MV E/A: 0.65
BH CV ECHO MEAS - MV MAX PG: 5.2 MMHG
BH CV ECHO MEAS - MV MEAN PG: 1.76 MMHG
BH CV ECHO MEAS - MV P1/2T: 59.4 MSEC
BH CV ECHO MEAS - MV V2 VTI: 20.4 CM
BH CV ECHO MEAS - MVA(P1/2T): 3.7 CM2
BH CV ECHO MEAS - MVA(VTI): 2.9 CM2
BH CV ECHO MEAS - PA ACC TIME: 0.13 SEC
BH CV ECHO MEAS - PA PR(ACCEL): 22 MMHG
BH CV ECHO MEAS - SI(MOD-SP2): 29.5 ML/M2
BH CV ECHO MEAS - SI(MOD-SP4): 34.1 ML/M2
BH CV ECHO MEAS - SV(LVOT): 58.8 ML
BH CV ECHO MEAS - SV(MOD-SP2): 61.1 ML
BH CV ECHO MEAS - SV(MOD-SP4): 70.7 ML
BH CV ECHO MEAS - TAPSE (>1.6): 2.03 CM
BH CV ECHO MEASUREMENTS AVERAGE E/E' RATIO: 6.1
BH CV XLRA - RV BASE: 3.5 CM
BH CV XLRA - RV LENGTH: 6.1 CM
BH CV XLRA - RV MID: 2.9 CM
BH CV XLRA - TDI S': 13.1 CM/SEC
LEFT ATRIUM VOLUME INDEX: 19.2 ML/M2
LV EF 2D ECHO EST: 50 %
MAXIMAL PREDICTED HEART RATE: 155 BPM
STRESS TARGET HR: 132 BPM

## 2023-01-20 PROCEDURE — 93306 TTE W/DOPPLER COMPLETE: CPT

## 2023-01-20 PROCEDURE — 93306 TTE W/DOPPLER COMPLETE: CPT | Performed by: INTERNAL MEDICINE

## 2023-06-15 RX ORDER — AMLODIPINE BESYLATE 2.5 MG/1
TABLET ORAL
Qty: 90 TABLET | OUTPATIENT
Start: 2023-06-15

## 2023-06-15 NOTE — TELEPHONE ENCOUNTER
Rx Refill Note  Requested Prescriptions     Pending Prescriptions Disp Refills    amLODIPine (NORVASC) 2.5 MG tablet [Pharmacy Med Name: AMLODIPINE BESYLATE 2.5 MG TAB] 90 tablet      Sig: TAKE 1 TABLET BY MOUTH EVERY DAY      Last office visit with prescribing clinician: 12/16/2022   Last telemedicine visit with prescribing clinician: Visit date not found   Next office visit with prescribing clinician: Visit date not found                         Would you like a call back once the refill request has been completed: [] Yes [] No    If the office needs to give you a call back, can they leave a voicemail: [] Yes [] No    Tram Quesada LPN  06/15/23, 08:18 EDT

## 2023-09-22 DIAGNOSIS — E03.9 ACQUIRED HYPOTHYROIDISM: ICD-10-CM

## 2023-09-22 RX ORDER — THYROID 60 MG
TABLET ORAL
Qty: 30 TABLET | Refills: 8 | Status: SHIPPED | OUTPATIENT
Start: 2023-09-22

## 2023-12-10 DIAGNOSIS — I10 PRIMARY HYPERTENSION: ICD-10-CM

## 2023-12-11 RX ORDER — LOSARTAN POTASSIUM 50 MG/1
50 TABLET ORAL DAILY
Qty: 90 TABLET | Refills: 7 | Status: SHIPPED | OUTPATIENT
Start: 2023-12-11

## 2024-03-29 DIAGNOSIS — E03.9 ACQUIRED HYPOTHYROIDISM: ICD-10-CM

## 2024-04-01 RX ORDER — THYROID 60 MG
TABLET ORAL
Qty: 90 TABLET | Refills: 0 | Status: SHIPPED | OUTPATIENT
Start: 2024-04-01

## 2024-10-08 DIAGNOSIS — E03.9 ACQUIRED HYPOTHYROIDISM: ICD-10-CM

## 2024-10-08 RX ORDER — THYROID 60 MG
TABLET ORAL
Qty: 90 TABLET | Refills: 0 | OUTPATIENT
Start: 2024-10-08

## 2024-10-19 DIAGNOSIS — E03.9 ACQUIRED HYPOTHYROIDISM: ICD-10-CM

## 2024-10-21 RX ORDER — THYROID 60 MG
TABLET ORAL
Qty: 90 TABLET | Refills: 0 | OUTPATIENT
Start: 2024-10-21

## 2024-11-11 DIAGNOSIS — E03.9 ACQUIRED HYPOTHYROIDISM: ICD-10-CM

## 2024-11-12 RX ORDER — THYROID,PORK 60 MG
TABLET ORAL
Qty: 90 TABLET | Refills: 0 | OUTPATIENT
Start: 2024-11-12

## 2025-01-15 DIAGNOSIS — I10 PRIMARY HYPERTENSION: ICD-10-CM

## 2025-01-15 RX ORDER — LOSARTAN POTASSIUM 50 MG/1
50 TABLET ORAL DAILY
Qty: 90 TABLET | Refills: 7 | OUTPATIENT
Start: 2025-01-15

## 2025-01-15 NOTE — TELEPHONE ENCOUNTER
Rx Refill Note  Requested Prescriptions     Pending Prescriptions Disp Refills    losartan (COZAAR) 50 MG tablet [Pharmacy Med Name: LOSARTAN POTASSIUM 50 MG TAB] 90 tablet 7     Sig: TAKE 1 TABLET BY MOUTH EVERY DAY      Last office visit with prescribing clinician: Visit date not found   Last telemedicine visit with prescribing clinician: Visit date not found   Next office visit with prescribing clinician: Visit date not found                         Would you like a call back once the refill request has been completed: [] Yes [] No    If the office needs to give you a call back, can they leave a voicemail: [] Yes [] No    Margaret Polanco MA  01/15/25, 07:37 EST